# Patient Record
Sex: FEMALE | Race: WHITE | Employment: OTHER | URBAN - NONMETROPOLITAN AREA
[De-identification: names, ages, dates, MRNs, and addresses within clinical notes are randomized per-mention and may not be internally consistent; named-entity substitution may affect disease eponyms.]

---

## 2020-01-23 PROBLEM — R53.1 WEAKNESS: Status: ACTIVE | Noted: 2020-01-23

## 2020-01-24 ENCOUNTER — APPOINTMENT (OUTPATIENT)
Dept: CT IMAGING | Age: 85
DRG: 193 | End: 2020-01-24
Attending: FAMILY MEDICINE
Payer: MEDICARE

## 2020-01-24 ENCOUNTER — APPOINTMENT (OUTPATIENT)
Dept: GENERAL RADIOLOGY | Age: 85
DRG: 193 | End: 2020-01-24
Attending: FAMILY MEDICINE
Payer: MEDICARE

## 2020-01-24 ENCOUNTER — HOSPITAL ENCOUNTER (INPATIENT)
Age: 85
LOS: 4 days | Discharge: HOSPICE | DRG: 193 | End: 2020-01-28
Attending: FAMILY MEDICINE | Admitting: FAMILY MEDICINE
Payer: MEDICARE

## 2020-01-24 PROBLEM — E43 SEVERE MALNUTRITION (HCC): Chronic | Status: ACTIVE | Noted: 2020-01-24

## 2020-01-24 LAB
ALBUMIN SERPL-MCNC: 3 G/DL (ref 3.5–5.1)
ALP BLD-CCNC: 165 U/L (ref 38–126)
ALT SERPL-CCNC: 18 U/L (ref 11–66)
ANION GAP SERPL CALCULATED.3IONS-SCNC: 15 MEQ/L (ref 8–16)
ANION GAP SERPL CALCULATED.3IONS-SCNC: 15 MEQ/L (ref 8–16)
AST SERPL-CCNC: 28 U/L (ref 5–40)
BACTERIA: ABNORMAL /HPF
BASOPHILS # BLD: 0.2 %
BASOPHILS ABSOLUTE: 0 THOU/MM3 (ref 0–0.1)
BILIRUB SERPL-MCNC: 0.3 MG/DL (ref 0.3–1.2)
BILIRUBIN URINE: NEGATIVE
BLOOD, URINE: NEGATIVE
BUN BLDV-MCNC: 63 MG/DL (ref 7–22)
BUN BLDV-MCNC: 63 MG/DL (ref 7–22)
CALCIUM SERPL-MCNC: 8.6 MG/DL (ref 8.5–10.5)
CALCIUM SERPL-MCNC: 8.8 MG/DL (ref 8.5–10.5)
CASTS 2: ABNORMAL /LPF
CASTS UA: ABNORMAL /LPF
CHARACTER, URINE: ABNORMAL
CHLORIDE BLD-SCNC: 110 MEQ/L (ref 98–111)
CHLORIDE BLD-SCNC: 111 MEQ/L (ref 98–111)
CO2: 14 MEQ/L (ref 23–33)
CO2: 16 MEQ/L (ref 23–33)
COLOR: YELLOW
CREAT SERPL-MCNC: 1.6 MG/DL (ref 0.4–1.2)
CREAT SERPL-MCNC: 1.7 MG/DL (ref 0.4–1.2)
CREATININE URINE: 86.9 MG/DL
CRYSTALS, UA: ABNORMAL
EKG ATRIAL RATE: 58 BPM
EKG P AXIS: 76 DEGREES
EKG P-R INTERVAL: 168 MS
EKG Q-T INTERVAL: 456 MS
EKG QRS DURATION: 90 MS
EKG QTC CALCULATION (BAZETT): 447 MS
EKG R AXIS: 88 DEGREES
EKG T AXIS: -84 DEGREES
EKG VENTRICULAR RATE: 58 BPM
EOSINOPHIL # BLD: 0.3 %
EOSINOPHILS ABSOLUTE: 0 THOU/MM3 (ref 0–0.4)
EPITHELIAL CELLS, UA: ABNORMAL /HPF
ERYTHROCYTE [DISTWIDTH] IN BLOOD BY AUTOMATED COUNT: 15.7 % (ref 11.5–14.5)
ERYTHROCYTE [DISTWIDTH] IN BLOOD BY AUTOMATED COUNT: 50.8 FL (ref 35–45)
FERRITIN: 62 NG/ML (ref 10–291)
FLU A ANTIGEN: NEGATIVE
FLU B ANTIGEN: NEGATIVE
GFR SERPL CREATININE-BSD FRML MDRD: 28 ML/MIN/1.73M2
GFR SERPL CREATININE-BSD FRML MDRD: 30 ML/MIN/1.73M2
GLUCOSE BLD-MCNC: 72 MG/DL (ref 70–108)
GLUCOSE BLD-MCNC: 75 MG/DL (ref 70–108)
GLUCOSE URINE: NEGATIVE MG/DL
HCT VFR BLD CALC: 32.4 % (ref 37–47)
HEMOGLOBIN: 9.8 GM/DL (ref 12–16)
IMMATURE GRANS (ABS): 0.03 THOU/MM3 (ref 0–0.07)
IMMATURE GRANULOCYTES: 0.5 %
IRON: 30 UG/DL (ref 50–170)
KETONES, URINE: NEGATIVE
LEUKOCYTE ESTERASE, URINE: NEGATIVE
LYMPHOCYTES # BLD: 8.1 %
LYMPHOCYTES ABSOLUTE: 0.5 THOU/MM3 (ref 1–4.8)
MCH RBC QN AUTO: 27.1 PG (ref 26–33)
MCHC RBC AUTO-ENTMCNC: 30.2 GM/DL (ref 32.2–35.5)
MCV RBC AUTO: 89.8 FL (ref 81–99)
MISCELLANEOUS 2: ABNORMAL
MONOCYTES # BLD: 4.9 %
MONOCYTES ABSOLUTE: 0.3 THOU/MM3 (ref 0.4–1.3)
NITRITE, URINE: NEGATIVE
NUCLEATED RED BLOOD CELLS: 0 /100 WBC
PH UA: 5 (ref 5–9)
PLATELET # BLD: 159 THOU/MM3 (ref 130–400)
PMV BLD AUTO: 10.9 FL (ref 9.4–12.4)
POTASSIUM REFLEX MAGNESIUM: 4.4 MEQ/L (ref 3.5–5.2)
POTASSIUM SERPL-SCNC: 4.7 MEQ/L (ref 3.5–5.2)
PREALBUMIN: 8.5 MG/DL (ref 20–40)
PRO-BNP: ABNORMAL PG/ML (ref 0–1800)
PROCALCITONIN: 0.25 NG/ML (ref 0.01–0.09)
PROTEIN UA: 30
RBC # BLD: 3.61 MILL/MM3 (ref 4.2–5.4)
RBC URINE: ABNORMAL /HPF
RENAL EPITHELIAL, UA: ABNORMAL
SEG NEUTROPHILS: 86 %
SEGMENTED NEUTROPHILS ABSOLUTE COUNT: 5.5 THOU/MM3 (ref 1.8–7.7)
SODIUM BLD-SCNC: 140 MEQ/L (ref 135–145)
SODIUM BLD-SCNC: 141 MEQ/L (ref 135–145)
SODIUM URINE: < 20 MEQ/L
SPECIFIC GRAVITY, URINE: 1.01 (ref 1–1.03)
TOTAL CK: 107 U/L (ref 30–135)
TOTAL IRON BINDING CAPACITY: 228 UG/DL (ref 171–450)
TOTAL PROTEIN: 6.2 G/DL (ref 6.1–8)
UROBILINOGEN, URINE: 0.2 EU/DL (ref 0–1)
WBC # BLD: 6.4 THOU/MM3 (ref 4.8–10.8)
WBC UA: ABNORMAL /HPF
YEAST: ABNORMAL

## 2020-01-24 PROCEDURE — 2580000003 HC RX 258: Performed by: INTERNAL MEDICINE

## 2020-01-24 PROCEDURE — 99223 1ST HOSP IP/OBS HIGH 75: CPT | Performed by: PHYSICIAN ASSISTANT

## 2020-01-24 PROCEDURE — 2709999900 HC NON-CHARGEABLE SUPPLY

## 2020-01-24 PROCEDURE — 87449 NOS EACH ORGANISM AG IA: CPT

## 2020-01-24 PROCEDURE — 3209999900 XR COMPARISON OF OUTSIDE FILMS

## 2020-01-24 PROCEDURE — 94669 MECHANICAL CHEST WALL OSCILL: CPT

## 2020-01-24 PROCEDURE — 71045 X-RAY EXAM CHEST 1 VIEW: CPT

## 2020-01-24 PROCEDURE — 6370000000 HC RX 637 (ALT 250 FOR IP): Performed by: INTERNAL MEDICINE

## 2020-01-24 PROCEDURE — 84134 ASSAY OF PREALBUMIN: CPT

## 2020-01-24 PROCEDURE — 2580000003 HC RX 258: Performed by: PHYSICIAN ASSISTANT

## 2020-01-24 PROCEDURE — 94761 N-INVAS EAR/PLS OXIMETRY MLT: CPT

## 2020-01-24 PROCEDURE — 80053 COMPREHEN METABOLIC PANEL: CPT

## 2020-01-24 PROCEDURE — 83880 ASSAY OF NATRIURETIC PEPTIDE: CPT

## 2020-01-24 PROCEDURE — 71250 CT THORAX DX C-: CPT

## 2020-01-24 PROCEDURE — 82728 ASSAY OF FERRITIN: CPT

## 2020-01-24 PROCEDURE — 6370000000 HC RX 637 (ALT 250 FOR IP): Performed by: PHYSICIAN ASSISTANT

## 2020-01-24 PROCEDURE — 83550 IRON BINDING TEST: CPT

## 2020-01-24 PROCEDURE — P9047 ALBUMIN (HUMAN), 25%, 50ML: HCPCS | Performed by: INTERNAL MEDICINE

## 2020-01-24 PROCEDURE — 97530 THERAPEUTIC ACTIVITIES: CPT

## 2020-01-24 PROCEDURE — 82550 ASSAY OF CK (CPK): CPT

## 2020-01-24 PROCEDURE — 6360000002 HC RX W HCPCS: Performed by: INTERNAL MEDICINE

## 2020-01-24 PROCEDURE — 81001 URINALYSIS AUTO W/SCOPE: CPT

## 2020-01-24 PROCEDURE — 80048 BASIC METABOLIC PNL TOTAL CA: CPT

## 2020-01-24 PROCEDURE — 6360000002 HC RX W HCPCS: Performed by: PHYSICIAN ASSISTANT

## 2020-01-24 PROCEDURE — 82570 ASSAY OF URINE CREATININE: CPT

## 2020-01-24 PROCEDURE — 93005 ELECTROCARDIOGRAM TRACING: CPT | Performed by: PHYSICIAN ASSISTANT

## 2020-01-24 PROCEDURE — 87040 BLOOD CULTURE FOR BACTERIA: CPT

## 2020-01-24 PROCEDURE — 94640 AIRWAY INHALATION TREATMENT: CPT

## 2020-01-24 PROCEDURE — 83540 ASSAY OF IRON: CPT

## 2020-01-24 PROCEDURE — 87804 INFLUENZA ASSAY W/OPTIC: CPT

## 2020-01-24 PROCEDURE — 1200000003 HC TELEMETRY R&B

## 2020-01-24 PROCEDURE — 84300 ASSAY OF URINE SODIUM: CPT

## 2020-01-24 PROCEDURE — 36415 COLL VENOUS BLD VENIPUNCTURE: CPT

## 2020-01-24 PROCEDURE — 84145 PROCALCITONIN (PCT): CPT

## 2020-01-24 PROCEDURE — 87899 AGENT NOS ASSAY W/OPTIC: CPT

## 2020-01-24 PROCEDURE — 97166 OT EVAL MOD COMPLEX 45 MIN: CPT

## 2020-01-24 PROCEDURE — 85025 COMPLETE CBC W/AUTO DIFF WBC: CPT

## 2020-01-24 RX ORDER — FUROSEMIDE 10 MG/ML
20 INJECTION INTRAMUSCULAR; INTRAVENOUS ONCE
Status: DISCONTINUED | OUTPATIENT
Start: 2020-01-24 | End: 2020-01-24

## 2020-01-24 RX ORDER — PREDNISONE 20 MG/1
40 TABLET ORAL DAILY
Status: DISCONTINUED | OUTPATIENT
Start: 2020-01-24 | End: 2020-01-24

## 2020-01-24 RX ORDER — FUROSEMIDE 10 MG/ML
20 INJECTION INTRAMUSCULAR; INTRAVENOUS ONCE
Status: COMPLETED | OUTPATIENT
Start: 2020-01-24 | End: 2020-01-24

## 2020-01-24 RX ORDER — ALBUTEROL SULFATE 2.5 MG/3ML
2.5 SOLUTION RESPIRATORY (INHALATION) EVERY 6 HOURS PRN
Status: DISCONTINUED | OUTPATIENT
Start: 2020-01-24 | End: 2020-01-24

## 2020-01-24 RX ORDER — HYDRALAZINE HYDROCHLORIDE 25 MG/1
25 TABLET, FILM COATED ORAL EVERY 8 HOURS SCHEDULED
Status: DISCONTINUED | OUTPATIENT
Start: 2020-01-24 | End: 2020-01-28

## 2020-01-24 RX ORDER — DOXYCYCLINE HYCLATE 100 MG
100 TABLET ORAL EVERY 12 HOURS SCHEDULED
Status: DISCONTINUED | OUTPATIENT
Start: 2020-01-24 | End: 2020-01-24

## 2020-01-24 RX ORDER — AZITHROMYCIN 250 MG/1
500 TABLET, FILM COATED ORAL ONCE
Status: COMPLETED | OUTPATIENT
Start: 2020-01-24 | End: 2020-01-24

## 2020-01-24 RX ORDER — NICOTINE 21 MG/24HR
1 PATCH, TRANSDERMAL 24 HOURS TRANSDERMAL DAILY
Status: DISCONTINUED | OUTPATIENT
Start: 2020-01-24 | End: 2020-01-28 | Stop reason: HOSPADM

## 2020-01-24 RX ORDER — PRAVASTATIN SODIUM 40 MG
40 TABLET ORAL DAILY
Status: DISCONTINUED | OUTPATIENT
Start: 2020-01-24 | End: 2020-01-28 | Stop reason: HOSPADM

## 2020-01-24 RX ORDER — SODIUM CHLORIDE 0.9 % (FLUSH) 0.9 %
10 SYRINGE (ML) INJECTION EVERY 12 HOURS SCHEDULED
Status: DISCONTINUED | OUTPATIENT
Start: 2020-01-24 | End: 2020-01-28 | Stop reason: HOSPADM

## 2020-01-24 RX ORDER — SODIUM CHLORIDE 0.9 % (FLUSH) 0.9 %
10 SYRINGE (ML) INJECTION PRN
Status: DISCONTINUED | OUTPATIENT
Start: 2020-01-24 | End: 2020-01-28 | Stop reason: HOSPADM

## 2020-01-24 RX ORDER — SODIUM BICARBONATE 650 MG/1
650 TABLET ORAL 3 TIMES DAILY
Status: COMPLETED | OUTPATIENT
Start: 2020-01-24 | End: 2020-01-26

## 2020-01-24 RX ORDER — AMLODIPINE BESYLATE 10 MG/1
10 TABLET ORAL DAILY
Status: DISCONTINUED | OUTPATIENT
Start: 2020-01-24 | End: 2020-01-27

## 2020-01-24 RX ORDER — GUAIFENESIN, DEXTROMETHORPHAN HBR 600; 30 MG/1; MG/1
1 TABLET ORAL 2 TIMES DAILY
Status: DISCONTINUED | OUTPATIENT
Start: 2020-01-24 | End: 2020-01-28 | Stop reason: HOSPADM

## 2020-01-24 RX ORDER — LORAZEPAM 2 MG/ML
0.25 INJECTION INTRAMUSCULAR ONCE
Status: COMPLETED | OUTPATIENT
Start: 2020-01-24 | End: 2020-01-24

## 2020-01-24 RX ORDER — ACETAMINOPHEN 325 MG/1
650 TABLET ORAL EVERY 4 HOURS PRN
Status: DISCONTINUED | OUTPATIENT
Start: 2020-01-24 | End: 2020-01-28 | Stop reason: HOSPADM

## 2020-01-24 RX ORDER — SODIUM CHLORIDE 9 MG/ML
INJECTION, SOLUTION INTRAVENOUS CONTINUOUS
Status: DISCONTINUED | OUTPATIENT
Start: 2020-01-24 | End: 2020-01-25

## 2020-01-24 RX ORDER — PREDNISONE 20 MG/1
20 TABLET ORAL DAILY
Status: DISCONTINUED | OUTPATIENT
Start: 2020-01-25 | End: 2020-01-27

## 2020-01-24 RX ORDER — IPRATROPIUM BROMIDE AND ALBUTEROL SULFATE 2.5; .5 MG/3ML; MG/3ML
1 SOLUTION RESPIRATORY (INHALATION)
Status: DISCONTINUED | OUTPATIENT
Start: 2020-01-24 | End: 2020-01-25 | Stop reason: SDUPTHER

## 2020-01-24 RX ORDER — ONDANSETRON 2 MG/ML
4 INJECTION INTRAMUSCULAR; INTRAVENOUS EVERY 6 HOURS PRN
Status: DISCONTINUED | OUTPATIENT
Start: 2020-01-24 | End: 2020-01-28 | Stop reason: HOSPADM

## 2020-01-24 RX ORDER — ALBUMIN (HUMAN) 12.5 G/50ML
25 SOLUTION INTRAVENOUS ONCE
Status: COMPLETED | OUTPATIENT
Start: 2020-01-24 | End: 2020-01-24

## 2020-01-24 RX ORDER — AZITHROMYCIN 250 MG/1
250 TABLET, FILM COATED ORAL DAILY
Status: COMPLETED | OUTPATIENT
Start: 2020-01-25 | End: 2020-01-28

## 2020-01-24 RX ORDER — IPRATROPIUM BROMIDE AND ALBUTEROL SULFATE 2.5; .5 MG/3ML; MG/3ML
1 SOLUTION RESPIRATORY (INHALATION) EVERY 4 HOURS PRN
Status: DISCONTINUED | OUTPATIENT
Start: 2020-01-24 | End: 2020-01-25

## 2020-01-24 RX ORDER — HEPARIN SODIUM 5000 [USP'U]/ML
2000 INJECTION, SOLUTION INTRAVENOUS; SUBCUTANEOUS EVERY 12 HOURS
Status: DISCONTINUED | OUTPATIENT
Start: 2020-01-25 | End: 2020-01-27

## 2020-01-24 RX ORDER — LORAZEPAM 0.5 MG/1
0.25 TABLET ORAL DAILY PRN
Status: DISCONTINUED | OUTPATIENT
Start: 2020-01-24 | End: 2020-01-28 | Stop reason: HOSPADM

## 2020-01-24 RX ADMIN — SODIUM BICARBONATE 650 MG: 650 TABLET ORAL at 13:28

## 2020-01-24 RX ADMIN — SODIUM BICARBONATE 650 MG: 650 TABLET ORAL at 23:58

## 2020-01-24 RX ADMIN — ALBUMIN (HUMAN) 25 G: 0.25 INJECTION, SOLUTION INTRAVENOUS at 10:44

## 2020-01-24 RX ADMIN — IPRATROPIUM BROMIDE AND ALBUTEROL SULFATE 1 AMPULE: .5; 3 SOLUTION RESPIRATORY (INHALATION) at 12:17

## 2020-01-24 RX ADMIN — SODIUM CHLORIDE: 9 INJECTION, SOLUTION INTRAVENOUS at 09:50

## 2020-01-24 RX ADMIN — ALBUTEROL SULFATE 2.5 MG: 2.5 SOLUTION RESPIRATORY (INHALATION) at 05:44

## 2020-01-24 RX ADMIN — Medication 10 ML: at 09:50

## 2020-01-24 RX ADMIN — FUROSEMIDE 20 MG: 40 INJECTION, SOLUTION INTRAMUSCULAR; INTRAVENOUS at 11:48

## 2020-01-24 RX ADMIN — IPRATROPIUM BROMIDE AND ALBUTEROL SULFATE 1 AMPULE: .5; 3 SOLUTION RESPIRATORY (INHALATION) at 21:45

## 2020-01-24 RX ADMIN — PRAVASTATIN SODIUM 40 MG: 40 TABLET ORAL at 23:58

## 2020-01-24 RX ADMIN — GUAIFENESIN AND DEXTROMETHORPHAN HYDROBROMIDE 1 TABLET: 600; 30 TABLET, EXTENDED RELEASE ORAL at 23:58

## 2020-01-24 RX ADMIN — CEFTRIAXONE SODIUM 1 G: 1 INJECTION, POWDER, FOR SOLUTION INTRAMUSCULAR; INTRAVENOUS at 11:49

## 2020-01-24 RX ADMIN — ASPIRIN 325 MG: 325 TABLET, DELAYED RELEASE ORAL at 10:59

## 2020-01-24 RX ADMIN — LORAZEPAM 0.25 MG: 2 INJECTION INTRAMUSCULAR; INTRAVENOUS at 17:10

## 2020-01-24 RX ADMIN — Medication 10 ML: at 23:58

## 2020-01-24 RX ADMIN — IPRATROPIUM BROMIDE AND ALBUTEROL SULFATE 1 AMPULE: .5; 3 SOLUTION RESPIRATORY (INHALATION) at 15:43

## 2020-01-24 RX ADMIN — GUAIFENESIN AND DEXTROMETHORPHAN HYDROBROMIDE 1 TABLET: 600; 30 TABLET, EXTENDED RELEASE ORAL at 10:48

## 2020-01-24 RX ADMIN — AZITHROMYCIN 500 MG: 250 TABLET, FILM COATED ORAL at 10:50

## 2020-01-24 ASSESSMENT — ENCOUNTER SYMPTOMS
DIARRHEA: 0
COLOR CHANGE: 0
SINUS PAIN: 0
RHINORRHEA: 0
NAUSEA: 0
BACK PAIN: 0
ABDOMINAL PAIN: 0
EYE REDNESS: 0
SHORTNESS OF BREATH: 1
COUGH: 1
ABDOMINAL DISTENTION: 0
EYE PAIN: 0
CONSTIPATION: 0

## 2020-01-24 NOTE — H&P
[address] [place] [address] Hospitalist History & Physical   1/24/2020 12:54 AM   Assessment and Plan:        1. Generalized weakness:  Likely multifactorial with malnutrition, deconditioning, acute pneumonia, mild CHF. See below  2. Acute CA pneumonia: productive cough, inc in SOB. CXR reads right middle lobe pneumonia. Pend Urine Ag, procal, blood cultures. Start Doxycycline/Rocephin. 3. Mild Decompensated HFpEF 50-55%: likely 2/2 aortic stenosis/HTN. Acute decompensation 2/2 infection. P BMP 64302. Echo 1/23/20 (at [facility]) showed EF of 50-55%, Moderate diastolic dysfunction, severe b/l atrial enlargement, moderate mitral regurgitation, moderate aortic stenosis. Gentle diuresis. Daily weights. 4. Mild COPD exacerbation: likely 2/2 pna. Inc cough, SOB, sputum. On exam diffuse wheezing. 40 prednisone x5 days. Continue ABx. PRN duonebs. 5. JACKIE on CKD stage 2: (baseline Cr 0.7) Toya prerenal with BUN:Cr 37 2/2 CHF. Pend FENa. Gentle Diuresis. Daily IV lasix. Strict I&Os. Monitor with repeat BMP  6. Bilateral Pitting Edema Lower Extremity Edema: likey 2/2 CHF and ? norvasc involvement. Continue lasix, hold norvasc. 7. Essential Hypertension, controlled: Hold home norvasc for LE edema. Monitor. 8. Hyperlipidemia, controlled: Continue home statin. 9. Chronic normocytic anemia: likely 2/2 chronic disease.(hgb 9.8, MCV 89.8). Pend Fe studies. Monitor. Transfuse for hgb <7.   10. Isolated Alk Phos Elevation: follow up OP  11. Severe Malnutrition/Deconditioning: Albumin 3.0, Pre-albumin 8.5. Supplement shakes. Consult to dietician. Consult PT.   12. Code Status: Patient states she would like to be DNR but she is not oriented. We were not able to contact POA tonight. Will consult spiritual care/palliative care.      CC:  Generalized Weakness  HPI: Gray Wheeler is a 80year old white female current 0.5 PPD smoker with PMH of HTN, HLD, Centrilobaular emphysema, Thoracic AAA, Asx Carotid stenosis s/p left carotid endarterectomy, CKD, Macular degeneration, Osteoporosis who presents to [facility] from [facility] c/o generalized weakness since 12/25/19. Patient is oriented to self, president but not to the year or month. Patient when asked why she is here has given multiple varying answers including blood pressure, pneumonia, leg swelling. Patient endorses SOB, cough, inc sputum, LE edema x 3 weeks. Denies CP, abd pain, N/V/D. Need to verify medications/PMH with POA (daughter). At [facility], vitals showed: HR 66, RR 16, /56, 91% on RA (placed on 2 L 96%) and afebrile. Labs showed: BNP 57024, creat 1.74, GFR 26 BUN 62, ALk [id number], Hgb. 11.3. EKG showed NSR. CXR showed \"asymmetrical hilar densities, patchy rt bibasilar opacities possible pneumonia vs post obstructive atelectasis related to central lobar lesion\". Echo shows EF of 50-55 % Moderate diastolic dysfunction, normal rt ventrical severe bi- atrial enlargement, Mod aortic valve stenosis with mild regurg, Mod regurg in both mitral and tricuspid valves. Review of Systems   Constitutional: Negative for chills and fever. HENT: Negative for postnasal drip, rhinorrhea and sinus pain. Eyes: Negative for pain and redness. Respiratory: Positive for cough and shortness of breath. Cardiovascular: Positive for leg swelling (3 weeks). Negative for chest pain. Gastrointestinal: Negative for abdominal distention, abdominal pain, constipation, diarrhea and nausea. Endocrine: Negative for polydipsia and polyphagia. Genitourinary: Negative for frequency and urgency. Musculoskeletal: Negative for back pain and neck pain. Skin: Negative for color change and rash. Neurological: Positive for weakness. Negative for light-headedness and headaches. Psychiatric/Behavioral: Positive for confusion. Negative for agitation. PMH:  Per HPI  SHX:  Current 0.5 PPD smoker.  Denies drug or EtOH use  FHX: Unable to obtain  Allergies: Metonidazole, Benzoate  Medications: expansion. Diffuse wheezing. No rales or rhonchi. No retractions or use of abdominal muscles. Cardiac: S1, S2, RRR without murmur, rub, or gallop. No JVD  Abdomen: Abdomen flat, soft, nontender to palpation, without guarding or rigidity. Normoactive BS. Peripheral Vasculature: B/l extremities with 3+ pitting edema to knee. DP pulses found via US. Delayed capillary refill. Skin:  Skin moist, turgor with delayed recoil. No lesions, rash. Psych:  Alert and oriented to self and president but not to year or month. Confusions comes and goes. Affect appropriate  Lymph:  No supraclavicular or cervical adenopathy. Neurologic: No focal deficits. No Seizures.      Data:   Labs:   Results for orders placed or performed during the hospital encounter of 01/24/20   CBC auto differential   Result Value Ref Range    WBC 6.4 4.8 - 10.8 thou/mm3    RBC 3.61 (L) 4.20 - 5.40 mill/mm3    Hemoglobin 9.8 (L) 12.0 - 16.0 gm/dl    Hematocrit 32.4 (L) 37.0 - 47.0 %    MCV 89.8 81.0 - 99.0 fL    MCH 27.1 26.0 - 33.0 pg    MCHC 30.2 (L) 32.2 - 35.5 gm/dl    RDW-CV 15.7 (H) 11.5 - 14.5 %    RDW-SD 50.8 (H) 35.0 - 45.0 fL    Platelets 721 059 - 670 thou/mm3    MPV 10.9 9.4 - 12.4 fL    Seg Neutrophils 86.0 %    Lymphocytes 8.1 %    Monocytes 4.9 %    Eosinophils 0.3 %    Basophils 0.2 %    Immature Granulocytes 0.5 %    Segs Absolute 5.5 1.8 - 7.7 thou/mm3    Lymphocytes Absolute 0.5 (L) 1.0 - 4.8 thou/mm3    Monocytes Absolute 0.3 (L) 0.4 - 1.3 thou/mm3    Eosinophils Absolute 0.0 0.0 - 0.4 thou/mm3    Basophils Absolute 0.0 0.0 - 0.1 thou/mm3    Immature Grans (Abs) 0.03 0.00 - 0.07 thou/mm3    nRBC 0 /100 wbc   Comprehensive Metabolic Panel w/ Reflex to MG   Result Value Ref Range    Glucose 75 70 - 108 mg/dL    CREATININE 1.7 (H) 0.4 - 1.2 mg/dL    BUN 63 (H) 7 - 22 mg/dL    Sodium 141 135 - 145 meq/L    Potassium reflex Magnesium 4.4 3.5 - 5.2 meq/L    Chloride 110 98 - 111 meq/L    CO2 16 (L) 23 - 33 meq/L    Calcium 8.8 8.5 - 10.5 mg/dL    AST 28 5 - 40 U/L    Alkaline Phosphatase 165 (H) 38 - 126 U/L    Total Protein 6.2 6.1 - 8.0 g/dL    Alb 3.0 (L) 3.5 - 5.1 g/dL    Total Bilirubin 0.3 0.3 - 1.2 mg/dL    ALT 18 11 - 66 U/L   Prealbumin   Result Value Ref Range    Prealbumin 8.5 (L) 20.0 - 40.0 mg/dl   Brain Natriuretic Peptide   Result Value Ref Range    Pro-BNP 19480.0 (H) 0.0 - 1800.0 pg/mL   Anion Gap   Result Value Ref Range    Anion Gap 15.0 8.0 - 16.0 meq/L   Glomerular Filtration Rate, Estimated   Result Value Ref Range    Est, Glom Filt Rate 28 (A) ml/min/1.73m2   EKG 12 Lead   Result Value Ref Range    Ventricular Rate 58 BPM    Atrial Rate 58 BPM    P-R Interval 168 ms    QRS Duration 90 ms    Q-T Interval 456 ms    QTc Calculation (Bazett) 447 ms    P Axis 76 degrees    R Axis 88 degrees    T Axis -84 degrees       EKG / Radiology:     EKG:  Reviewed by me: sinus bradycardia (HR 58) with PVCs. New t-wave inversion in lateral/inferior leads. CXR:   Reviewed by me: Right middle lobe pneumonia. No results found.     Case and Plan discussed with Tere Salcido MD  Electronically signed by Lorrane Epley, [place] on 1/24/2020 at 8:47 AM

## 2020-01-24 NOTE — PROGRESS NOTES
Spoke to primary nurse regarding buttock and foot wound consult. Nurse states patient is 80 lbs, has superficial open area to coccyx, and a red spot on foot. Recommend sacral foam dressing, change every other day. Called distribution to have bilateral offloading boots. Have patient wear at all times while in bed and chair. Implement Gavino order sets for pressure injury prevention. Call wound ostomy as needed.

## 2020-01-24 NOTE — PROGRESS NOTES
poor po (consumes 2 meals/day pta - 1 meal is a yogurt, 2nd meal is frozen dinner, difficulty maintaining weight since food poisoning last July; Rx includes Lasix, Deltasone  · Wound Type: Stage III(and blister type area on foot)  · Current Nutrition Therapies:  · Oral Diet Orders: General, No Added Salt (3-4gm)   · Oral Diet intake: Unable to assess  · Oral Nutrition Supplement (ONS) Orders: Standard High Calorie Oral Supplement(TID)  · ONS intake: (initiated)  · Anthropometric Measures:  · Ht: 5' 2\" (157.5 cm)   · Current Body Wt: 75 lb (34 kg)(1/24, method not noted- ? stated; +3 edema)  · Admission Body Wt: 75 lb (34 kg)(1/24, method not noted- ? stated; +3 edema)  · Usual Body Wt: (per pt 102-105#; per EMR: 1/10/19: 96# 9.6 oz, 1/20/20: 82# 6.4 oz)  · % Weight Change:  ,  -14.5% in 1 year per EMR  · Ideal Body Wt: 110 lb (49.9 kg),  · BMI Classification: BMI <18.5 Underweight(13.7)    Nutrition Interventions:   Continue current diet, Start ONS  Continued Inpatient Monitoring, Education Initiated, Coordination of Care(1/24 Encouraged po intake at best efforts. Discussed small, frequent meals and appropriate use of ONS. Reviewed where to purchase ONS. Pt. voiced understanding.)    Nutrition Evaluation:   · Evaluation: Goals set   · Goals: Pt. will consume 75% or more of meals to promote wound healing during LOS.     · Monitoring: Meal Intake, Supplement Intake, Diet Tolerance, Wound Healing, Weight, Pertinent Labs, Patient/Family Education, Monitor Bowel Function      Electronically signed by Trini Gandhi RD, LD on 1/24/20 at 11:11 AM    Contact Number: [phone]

## 2020-01-24 NOTE — PROGRESS NOTES
Present to pt room for consult of right foot and coccyx wound. Pt noted to have healing scabbed area to right anterior foot where it appears that pt previously had blister that popped and is now healed. Area dry without drainage. Staff to continue to monitor. Pt does have edema to bilateral lower extremities - nurse states orders in for ACE wraps to BLE. Pt noted to have non blaching purple areas to bilateral knees present on admission. Pt did have fall at home and states she was down on knees. Staff to continue to monitor these areas. Pt rolled to left side for assessment. Pt has sacral foam in place. Peeled foam back. Pt noted to have unstagable pressure injury to coccyx present on admission. Cleansed wound with normal saline and gauze. Pat dry with clean gauze. Applied aquacel ag to wound bed followed by sacral bordered foam dressing. Staff to change dressing every other day. Staff to turn pt every 2 hours. Pt on hercules dream air support surface with alternating pressure and microclimate control. Assist pt to use restroom and stay dry and clean. No depends on. Offload bilateral heels with pillows. No redness or heel breakdown noted. Primary nurse at bedside preparing to straight cath patient. Coccyx unstageable pressure injury present on admission: 90% yellow, 10% red. Edges attached. Gretchen wound scarring and pink. Small yellow drainage. No odor. Right foot healing blistered area: 1.9cm x3cm. Wound bed dry and scabbed. No drainage. Edges attached. No odor. Thank you for allowing us to participate in the care of your patient. TIME   Wound/ostomy individual minutes  Time In: 0945  Time Out: 3161  Minutes: 10  Time does not include documentation.

## 2020-01-24 NOTE — PROGRESS NOTES
ADL's:  Additional Comments: declined ADls d/t fatigue. Functional Mobility:  Bed mobility  Supine to Sit: Minimal assistance(HOB raised increased time needed )  Scooting: Minimal assistance    Functional Mobility  Functional - Mobility Device: Rolling Walker  Activity: Other  Assist Level: Minimal assistance  Functional Mobility Comments: X 3 feet from EOB to recliner, vc needed for safety, pt impulsive      Balance:  Balance  Sitting Balance: Stand by assistance(at EOB X 5 minute sin prep for transfer, )  Standing Balance: Contact guard assistance(with BUE support on walker, reports feeling light headed stood X 3 minute sin prep for mobiliyt, reported feeling like she could not breath, seated rest break in recliner with pt reporting feeling better )    Transfers:  Sit to stand: Minimal assistance  Stand to sit: Minimal assistance  Transfer Comments: vc needed with tactile asisst to turn pt hips safely into recliner. pt attempting to sit prior to reaching destination. improvement noted wt vc and tactile assist       Upper Extremity Assessment:   LUE AROM : WFL  RUE AROM : WFL    LUE Strength  LUE Strength Comment: at least 3/5 grossly. pt very deconditioned this date    RUE Tone: Normotonic  LUE Tone: Normotonic       Activity Tolerance: Patient limited by fatigue  very SOB [facility] minimal activity    Assessment:  Assessment: Pt with stated deficits and very deconditioned this date requiring increased assitance with ADLs, transfers and mobility. Limited activiyt tolerance.  Pt will continue to benefit from skilled OT services to address deficits and increase safety and indep [facility] ADL routine  Performance deficits / Impairments: Decreased functional mobility , Decreased ADL status, Decreased safe awareness, Decreased balance, Decreased high-level IADLs, Decreased endurance, Decreased strength  Prognosis: Good  REQUIRES OT FOLLOW UP: Yes    Treatment Initiated: Treatment and education initiated within context of evaluation. Evaluation time included review of current medical information, gathering information related to past medical, social and functional history, completion of standardized testing, formal and informal observation of tasks, assessment of data and development of plan of care and goals. Treatment time included skilled education and facilitation of tasks to increase safety and independence with ADL's for improved functional independence and quality of life. Discharge Recommendations:  Continue to assess pending progress, Subacute/Skilled Nursing Facility, 24 hour supervision or assist, Patient would benefit from continued therapy after discharge    Patient Education:  OT Education: OT Role, Plan of Care, Precautions, ADL Adaptive Strategies, Transfer Training, Energy Conservation    Equipment Recommendations:   Other: con to assess, will needs shower chair, may benefit from [address]:  Times per week: 5x  Current Treatment Recommendations: Strengthening, Balance Training, Functional Mobility Training, Safety Education & Training, Self-Care / ADL, Endurance Training, Patient/Caregiver Education & Training    Goals:  Patient goals : to go home  Short term goals  Time Frame for Short term goals: by discharge  Short term goal 1: Pt will complete functional mobility to/from bathroom with SBA and RW with no vc for safety or breathing technique for ease with toileting routine  Short term goal 2: Pt will complete functional transfers with SBA and no vc for safety including to/from toilet  Short term goal 3: Pt will tolerate standing ADL/IADL with SBA X 3 minutes wtih 1-2 hand release and no lOB for ease with sink side grooming  Short term goal 4: Pt will complete BADL routine with min A and LHAE, EC strategies prn for ease with shower routine  Short term goal 5: Pt will tolerate BUE AROM exercises with no vc for breathing technique to increase UB strength and activity tolerance for ADLs  Long term

## 2020-01-24 NOTE — FLOWSHEET NOTE
01/24/20 1754   Provider Notification   Reason for Communication Evaluate   Provider Name [facility]   Provider Notification Physician   Method of Communication Call   Response No new orders   Notification Time [phone]     question of achalasia on CT scn of abdomen/pelvis

## 2020-01-24 NOTE — PROGRESS NOTES
Transfer  Report from [facility]  Referring Physician Dr Fabio Briseno  Accepting Physician Dr Sho Stearns  Patient Condition:87 yo female presents to [facility] ER with loss of appetite and SOB since [place]. Hx of HTN and high cholesterol as well as carotid artery stenosis. Was treated 3 weeks ago at urgent care for sinus infection with unknown antibiotic. Today has 3+ edema in bilateral lower legs from ankles to above the knees. PCP wanted her to go to ER a few days ago, she refused so he ordered labs. Results BNP 65729, creat 1.74, GFR 26 BUN 62, Na and K normal at 143 and 4.3. ALk [id number] which is normal for her, WBC 7.8 Hgb. 11.3, Plat 179 thyroid normal EKG sinus rhythm 66, CXR showed asymmetrical hilar densities, patchy rt bibasilar opacities possible pneumonia vs post obstructive atelectasis related to central lobar lesion. GFR to low to do CT scan. Vitals afebrile 66 16 131/56 91% on RA, placed on 2 L 96% . Echo shows EF of 50-55 % Moderate diastolic dysfunction, normal rt ventrical severe bi- atrial enlargement, Mod aortic valve stenosis with mild regurg, Mod regurg in both mitral and tricuspid valves.      Code Status full    Accepted on behalf of  Dr Sho Stearns to 3X02 with diagnosis of weakness

## 2020-01-24 NOTE — PROGRESS NOTES
Hospitalist Progress Note    Patient:  Roberto Hayes  YOB: 1933  MRN: [id number]   PCP: Mookie Marie MD         Acct: [de-identified]  Unit/Bed: [facility]30/030-    Date of Admission: 1/24/2020      ASSESSMENT   1. Community acquired bacterial pneumonia associated with mild COPD exacerbation. Procalcitonin increased. Continue breathing treatments, Mucinex DM, Acapella. Blood cultures ordered. Strep pneumonia antigen pending. 2. Acute on chronic heart failure with preserved EF in the setting of moderate aortic stenosis, severe pulmonary HTN, with bilateral LE edema: multifactorial bilateral LE edema- thought to be related to combination of low albumin, heart failure in the setting of moderate aortic stenosis. Possibly related to use of amlodipine so currently held. Will give Albumin and Lasix and reassess in am. Last echo on 1/23 showed EF of 50-55% with moderate aortic stenosis with JOSÉ of 1.3 cm2, moderate mitral regurigitation and modeate TR and VSP of 70 to 75 mmHg. Patient however dehydrated at the same yosvany as being fluid overloaded so will continue normal saline @ 40 mL/hr and reassess volume status later today. Ace wrap to the bilateral LE  3. Acute kidney injury with significant metabolic acidosis: secondary to pre-renal azotemia in the setting of heart failure and volume depletion in the setting of pneumonia. Sodium bicarbonate tabs. Avoid nephrotoxic medications. Au unremarkable for signs of infection  4. Normocytic anemia: Trend downwards in Hgb noted. Continue to monitor. No overt evidence of bleeding  5. Generalized weakness multifactorial related to #1, #2 and #3. Check magnesium, vitamin B12, folate, TSH  6. Essential HTN: Hold hydralazine, and amlodipine for now and reassess tomorrow  7. Isolated elevated alkaline phosphatase  8. Right foot ulcer: Does not appear to be infected. Wound care consult  9. Severe protein calorie malnutrition: Dietitian following.  Dietary supplements  10. Tobacco dependence: Smokes about 1/2 pack per day. Counseling provided and patient has not plans to quit and  Has no plans to quit  11. Hyperlipidemia: on pravastatin      PLAN     1. Breathing treatments, steroids at lower dose, Acapella. Mucinex DM. Treat with Ceftriaxone and azithromycin  2. Albumin, Lasix. Ace wraps to bilateral LED  3. Obtain CT chest to further investigate radiology note of \"post-obstructive atelactasis with possible centrilobular lesion'. 4. Trend troponin  5. Nutritional supplements  6. Sodium bicarbonate tabs - continue to monitor creatinine  7. Nicotine patch  8. Check Vitamin B12, folate, TSH  9. Reassess volume status later today  10. Management otherwise as noted above    ADDENDUM: Speech therapy evaluation added as nurse thought patient was choking on food. Ativan given for related anxiety. Gastroenterology consulted for question of achalasia. Pulmonology consulted for question of mucous plug with RML collapse    Anticipated Discharge in : 2-3 days    Code Status: Full Code    Electronically signed by Mana Joseph MD on 1/24/2020 at 4:50 PM      Chief Complaint     Transfer from [facility] for generalized weakness     SUBJECTIVE     The patient is a 80 y.o. female w/ a hx of tobacco dependence, essential HTN, HLD, moderate aortic stenosis and severe pulmonary HTN who was admitted for worsening shortness of breath and generalized weakness since December. The patient states that she initially had flu symptoms in December and has been persistently short of breath since then, with associated symptoms of a productive cough with worsening bilateral LE edema. She was initially evaluated at [facility] where EKG showed NSR, CXR showed \"asymmetrical hilar densities, patchy right bibasilar opacities possibly pneumonia vs post-obstructive atelactasis related to a central lobar lesion.        OBJECTIVE     Medications:  Reviewed    Infusion Medications    sodium chloride 40 Recent Labs     01/24/20  0407 01/24/20  0911    140   K 4.4 4.7    111   CO2 16* 14*   BUN 63* 63*   CREATININE 1.7* 1.6*   CALCIUM 8.8 8.6     Recent Labs     01/24/20  0407   AST 28   ALT 18   BILITOT 0.3   ALKPHOS 165*     No results for input(s): INR in the last 72 hours. Recent Labs     01/24/20  0911   CKTOTAL 107       Urinalysis:      Lab Results   Component Value Date    NITRU NEGATIVE 01/24/2020    WBCUA 0-2 01/24/2020    BACTERIA NONE SEEN 01/24/2020    RBCUA 0-2 01/24/2020    BLOODU NEGATIVE 01/24/2020    SPECGRAV 1.018 03/04/2015    GLUCOSEU NEGATIVE 01/24/2020       Diagnostic imaging/procedures       Xr Chest Portable    Result Date: 1/24/2020  PROCEDURE: XR CHEST PORTABLE CLINICAL INFORMATION: shortness of breath. COMPARISON: Chest x-ray dated 7/8/2016 TECHNIQUE: AP Portable chest xray FINDINGS: Lines/tubes/devices: None Lungs/pleura: There is right middle lobe pneumonia. Lungs are hyperinflated consistent with COPD. No pleural effusion. No pneumothorax. Heart: There is mild cardiomegaly, new compared to the prior study. Mediastinum/andrew: No obvious mass or adenopathy. Aorta is tortuous. Skeleton: No significant bone or joint abnormality. Other: There are again left neck surgical clips. Right middle lobe pneumonia. COPD. Mild cardiomegaly. **This report has been created using voice recognition software. It may contain minor errors which are inherent in voice recognition technology. ** Final report electronically signed by Dr. Prateek Lee on 1/24/2020 5:39 AM    Xr Comparison Of Outside Films    Result Date: 1/24/2020  Radiology exam is complete. No Radiologist dictation. Please follow up with ordering provider.        DVT prophylaxis: [x] Lovenox                                 [] SCDs                                 [] SQ Heparin                                 [] Encourage ambulation           [] Already on Anticoagulation     Disposition:    [x] Home       [] TCU       []

## 2020-01-25 ENCOUNTER — APPOINTMENT (OUTPATIENT)
Dept: ULTRASOUND IMAGING | Age: 85
DRG: 193 | End: 2020-01-25
Attending: FAMILY MEDICINE
Payer: MEDICARE

## 2020-01-25 LAB
ANION GAP SERPL CALCULATED.3IONS-SCNC: 17 MEQ/L (ref 8–16)
BUN BLDV-MCNC: 64 MG/DL (ref 7–22)
CALCIUM SERPL-MCNC: 8.5 MG/DL (ref 8.5–10.5)
CHLORIDE BLD-SCNC: 113 MEQ/L (ref 98–111)
CO2: 14 MEQ/L (ref 23–33)
CREAT SERPL-MCNC: 2.2 MG/DL (ref 0.4–1.2)
ERYTHROCYTE [DISTWIDTH] IN BLOOD BY AUTOMATED COUNT: 15.9 % (ref 11.5–14.5)
ERYTHROCYTE [DISTWIDTH] IN BLOOD BY AUTOMATED COUNT: 51.2 FL (ref 35–45)
FOLATE: 4 NG/ML (ref 4.8–24.2)
GFR SERPL CREATININE-BSD FRML MDRD: 21 ML/MIN/1.73M2
GLUCOSE BLD-MCNC: 74 MG/DL (ref 70–108)
HCT VFR BLD CALC: 28.6 % (ref 37–47)
HEMOGLOBIN: 8.6 GM/DL (ref 12–16)
LEGIONELLA PNEUMOPHILIA AG, URINE: NORMAL
MAGNESIUM: 2.1 MG/DL (ref 1.6–2.4)
MCH RBC QN AUTO: 26.8 PG (ref 26–33)
MCHC RBC AUTO-ENTMCNC: 30.1 GM/DL (ref 32.2–35.5)
MCV RBC AUTO: 89.1 FL (ref 81–99)
PLATELET # BLD: 139 THOU/MM3 (ref 130–400)
PMV BLD AUTO: 11.2 FL (ref 9.4–12.4)
POTASSIUM SERPL-SCNC: 4.7 MEQ/L (ref 3.5–5.2)
RBC # BLD: 3.21 MILL/MM3 (ref 4.2–5.4)
SODIUM BLD-SCNC: 144 MEQ/L (ref 135–145)
STREP PNEUMO AG, UR: NORMAL
TROPONIN T: 0.07 NG/ML
TSH SERPL DL<=0.05 MIU/L-ACNC: 3.88 UIU/ML (ref 0.4–4.2)
VITAMIN B-12: 690 PG/ML (ref 211–911)
WBC # BLD: 5.6 THOU/MM3 (ref 4.8–10.8)

## 2020-01-25 PROCEDURE — 85027 COMPLETE CBC AUTOMATED: CPT

## 2020-01-25 PROCEDURE — 99223 1ST HOSP IP/OBS HIGH 75: CPT | Performed by: INTERNAL MEDICINE

## 2020-01-25 PROCEDURE — 6370000000 HC RX 637 (ALT 250 FOR IP): Performed by: INTERNAL MEDICINE

## 2020-01-25 PROCEDURE — 84484 ASSAY OF TROPONIN QUANT: CPT

## 2020-01-25 PROCEDURE — 2580000003 HC RX 258: Performed by: INTERNAL MEDICINE

## 2020-01-25 PROCEDURE — 80048 BASIC METABOLIC PNL TOTAL CA: CPT

## 2020-01-25 PROCEDURE — 76770 US EXAM ABDO BACK WALL COMP: CPT

## 2020-01-25 PROCEDURE — 6360000002 HC RX W HCPCS: Performed by: INTERNAL MEDICINE

## 2020-01-25 PROCEDURE — 2500000003 HC RX 250 WO HCPCS: Performed by: INTERNAL MEDICINE

## 2020-01-25 PROCEDURE — 1200000003 HC TELEMETRY R&B

## 2020-01-25 PROCEDURE — 94760 N-INVAS EAR/PLS OXIMETRY 1: CPT

## 2020-01-25 PROCEDURE — 6370000000 HC RX 637 (ALT 250 FOR IP): Performed by: PHYSICIAN ASSISTANT

## 2020-01-25 PROCEDURE — 99222 1ST HOSP IP/OBS MODERATE 55: CPT | Performed by: INTERNAL MEDICINE

## 2020-01-25 PROCEDURE — 2709999900 HC NON-CHARGEABLE SUPPLY

## 2020-01-25 PROCEDURE — 2580000003 HC RX 258: Performed by: PHYSICIAN ASSISTANT

## 2020-01-25 PROCEDURE — 92610 EVALUATE SWALLOWING FUNCTION: CPT

## 2020-01-25 PROCEDURE — 83735 ASSAY OF MAGNESIUM: CPT

## 2020-01-25 PROCEDURE — 82607 VITAMIN B-12: CPT

## 2020-01-25 PROCEDURE — 84443 ASSAY THYROID STIM HORMONE: CPT

## 2020-01-25 PROCEDURE — 82746 ASSAY OF FOLIC ACID SERUM: CPT

## 2020-01-25 PROCEDURE — 36415 COLL VENOUS BLD VENIPUNCTURE: CPT

## 2020-01-25 PROCEDURE — 99233 SBSQ HOSP IP/OBS HIGH 50: CPT | Performed by: INTERNAL MEDICINE

## 2020-01-25 PROCEDURE — 6360000002 HC RX W HCPCS: Performed by: PHYSICIAN ASSISTANT

## 2020-01-25 PROCEDURE — 94640 AIRWAY INHALATION TREATMENT: CPT

## 2020-01-25 RX ORDER — FOLIC ACID 1 MG/1
1 TABLET ORAL DAILY
Status: DISCONTINUED | OUTPATIENT
Start: 2020-01-25 | End: 2020-01-28 | Stop reason: HOSPADM

## 2020-01-25 RX ORDER — IPRATROPIUM BROMIDE AND ALBUTEROL SULFATE 2.5; .5 MG/3ML; MG/3ML
1 SOLUTION RESPIRATORY (INHALATION) 4 TIMES DAILY
Status: DISCONTINUED | OUTPATIENT
Start: 2020-01-25 | End: 2020-01-28

## 2020-01-25 RX ADMIN — IPRATROPIUM BROMIDE AND ALBUTEROL SULFATE 1 AMPULE: .5; 3 SOLUTION RESPIRATORY (INHALATION) at 12:40

## 2020-01-25 RX ADMIN — GUAIFENESIN AND DEXTROMETHORPHAN HYDROBROMIDE 1 TABLET: 600; 30 TABLET, EXTENDED RELEASE ORAL at 08:45

## 2020-01-25 RX ADMIN — SODIUM CHLORIDE: 9 INJECTION, SOLUTION INTRAVENOUS at 16:27

## 2020-01-25 RX ADMIN — FOLIC ACID 1 MG: 1 TABLET ORAL at 13:32

## 2020-01-25 RX ADMIN — AZITHROMYCIN 250 MG: 250 TABLET, FILM COATED ORAL at 08:45

## 2020-01-25 RX ADMIN — IPRATROPIUM BROMIDE AND ALBUTEROL SULFATE 1 AMPULE: .5; 3 SOLUTION RESPIRATORY (INHALATION) at 20:32

## 2020-01-25 RX ADMIN — SODIUM BICARBONATE: 84 INJECTION, SOLUTION INTRAVENOUS at 23:37

## 2020-01-25 RX ADMIN — SODIUM BICARBONATE 650 MG: 650 TABLET ORAL at 08:45

## 2020-01-25 RX ADMIN — PREDNISONE 20 MG: 20 TABLET ORAL at 08:45

## 2020-01-25 RX ADMIN — SODIUM BICARBONATE 650 MG: 650 TABLET ORAL at 16:31

## 2020-01-25 RX ADMIN — HYDRALAZINE HYDROCHLORIDE 25 MG: 25 TABLET, FILM COATED ORAL at 23:26

## 2020-01-25 RX ADMIN — SODIUM BICARBONATE 650 MG: 650 TABLET ORAL at 23:26

## 2020-01-25 RX ADMIN — Medication 10 ML: at 09:04

## 2020-01-25 RX ADMIN — HYDRALAZINE HYDROCHLORIDE 25 MG: 25 TABLET, FILM COATED ORAL at 13:33

## 2020-01-25 RX ADMIN — HEPARIN SODIUM 2000 UNITS: 5000 INJECTION INTRAVENOUS; SUBCUTANEOUS at 23:26

## 2020-01-25 RX ADMIN — IPRATROPIUM BROMIDE AND ALBUTEROL SULFATE 1 AMPULE: .5; 3 SOLUTION RESPIRATORY (INHALATION) at 16:24

## 2020-01-25 RX ADMIN — PRAVASTATIN SODIUM 40 MG: 40 TABLET ORAL at 23:26

## 2020-01-25 RX ADMIN — ASPIRIN 325 MG: 325 TABLET, DELAYED RELEASE ORAL at 08:45

## 2020-01-25 RX ADMIN — CEFTRIAXONE SODIUM 1 G: 1 INJECTION, POWDER, FOR SOLUTION INTRAMUSCULAR; INTRAVENOUS at 09:03

## 2020-01-25 RX ADMIN — GUAIFENESIN AND DEXTROMETHORPHAN HYDROBROMIDE 1 TABLET: 600; 30 TABLET, EXTENDED RELEASE ORAL at 23:26

## 2020-01-25 RX ADMIN — IPRATROPIUM BROMIDE AND ALBUTEROL SULFATE 1 AMPULE: .5; 3 SOLUTION RESPIRATORY (INHALATION) at 07:59

## 2020-01-25 RX ADMIN — HEPARIN SODIUM 2000 UNITS: 5000 INJECTION INTRAVENOUS; SUBCUTANEOUS at 08:42

## 2020-01-25 ASSESSMENT — PAIN SCALES - GENERAL
PAINLEVEL_OUTOF10: 0
PAINLEVEL_OUTOF10: 0

## 2020-01-25 NOTE — CONSULTS
Supplement  Allergies    Flagyl [metronidazole]  Social History     Social History     Socioeconomic History    Marital status:      Spouse name: Not on file    Number of children: Not on file    Years of education: Not on file    Highest education level: Not on file   Occupational History    Not on file   Social Needs    Financial resource strain: Not on file    Food insecurity:     Worry: Not on file     Inability: Not on file    Transportation needs:     Medical: Not on file     Non-medical: Not on file   Tobacco Use    Smoking status: Current Every Day Smoker     Packs/day: 0.50    Smokeless tobacco: Never Used   Substance and Sexual Activity    Alcohol use: Yes     Comment: rarely    Drug use: No    Sexual activity: Not on file   Lifestyle    Physical activity:     Days per week: Not on file     Minutes per session: Not on file    Stress: Not on file   Relationships    Social connections:     Talks on phone: Not on file     Gets together: Not on file     Attends [employer] service: Not on file     Active member of club or organization: Not on file     Attends meetings of clubs or organizations: Not on file     Relationship status: Not on file    Intimate partner violence:     Fear of current or ex partner: Not on file     Emotionally abused: Not on file     Physically abused: Not on file     Forced sexual activity: Not on file   Other Topics Concern    Not on file   Social History Narrative    Not on file     Family History    No family history on file. ROS    General/Constitutional: Positive, wt loss, weakness   HENT: Negative. Eyes: Negative. Upper respiratory tract: No nasal stuffiness or post nasal drip. Lower respiratory tract/ lungs: positive productive cough, tan colored sputum production, wheezing, no hemoptysis. Cardiovascular: No palpitations, chest pain, positive bilateral LE edema  Gastrointestinal: No nausea or vomiting.   Neurological: confusion, forgetfulness Extremities: No tenderness. Musculoskeletal: no complaints  Genitourinary: No complaints. Hematological: Negative. Denies easy buising  Skin: No itching. Meds    Current Medications    folic acid  1 mg Oral Daily    sodium chloride flush  10 mL Intravenous 2 times per day    [Held by provider] amLODIPine  10 mg Oral Daily    aspirin  325 mg Oral Daily    hydrALAZINE  25 mg Oral 3 times per day    pravastatin  40 mg Oral Daily    cefTRIAXone (ROCEPHIN) IV  1 g Intravenous Q24H    azithromycin  250 mg Oral Daily    dextromethorphan-guaiFENesin  1 tablet Oral BID    ipratropium-albuterol  1 ampule Inhalation Q4H WA    sodium bicarbonate  650 mg Oral TID    nicotine  1 patch Transdermal Daily    predniSONE  20 mg Oral Daily    heparin (porcine)  2,000 Units Subcutaneous Q12H     sodium chloride flush, magnesium hydroxide, ondansetron, acetaminophen, ipratropium-albuterol, LORazepam  IV Drips/Infusions   sodium chloride 40 mL/hr at 01/24/20 0950     Vitals    Vitals    height is 5' 2\" (1.575 m) and weight is 75 lb (34 kg). Her oral temperature is 98.4 °F (36.9 °C). Her blood pressure is 133/54 (abnormal) and her pulse is 78. Her respiration is 16 and oxygen saturation is 96%. I/O    Intake/Output Summary (Last 24 hours) at 1/25/2020 0942  Last data filed at 1/24/2020 1323  Gross per 24 hour   Intake 202 ml   Output 400 ml   Net -198 ml     Patient Vitals for the past 96 hrs (Last 3 readings):   Weight   01/24/20 0900 75 lb (34 kg)     Exam   Constitutional: Elderly lady, severely malnourished,BMI 15, on supplemental oxygen, able to answer simple questions. Head: Normocephalic and atraumatic. Mouth/Throat: Oropharynx is clear and moist.  No oral thrush. Eyes: Conjunctivae are normal. Pupils are equal, round, and reactive to light. No scleral icterus. Neck: JVD  Cardiovascular: Regular rate, regular rhythm, S1 and S2 with no murmur.   No peripheral edema  Pulmonary/Chest: Coarse rhonchi

## 2020-01-25 NOTE — CONSULTS
Patient seen and examined, note dictated. A/P  1. Patient admitted with malnutrition, and pneumonia. A CT scan showed dilated esophagus with possible achalasia. Will change to liquids PO;  Check esophogram 1/27/2020. (Cannot due EGD at this time due to pneumonia). Will follow, tx!

## 2020-01-25 NOTE — PROGRESS NOTES
Impaired:  Delayed Swallow, Decreased Hyolaryngeal Elevation, Audible Swallow and Suspected Pharyngeal Residue    Signs and Symptoms of Laryngeal Penetration/Aspiration: Throat Clear, Immediate Cough, Delayed Cough and Wet Vocal Quality    Impresssions: Pt presents with mild-moderate oropharyngeal dysphagia characterized by the findings outlined above. Pt reports she prefers soft foods at home due to hard/tough solids \"getting stuck in throat. \" Reduced oral manipulation of puree, soft solid and hard solid textures, resulting in prolonged oral phase. Pt with overt expression of difficulty masticating hard solid cracker, stating, \"I don't like this it is too hard. \" Following prolonged breakdown and bolus formation period, slow but functional AP transit noted. Slightly delayed trigger of swallow reflex with decreased hyolaryngeal anterior excursion noted via laryngeal palpation. Pt requiring x3-4 swallows per single bite of puree, soft and hard solids, stating soft and hard solids \"stay stuck in my throat\" while palpating her larynx. Immediate coughing in 1/1 trials of hard solid cracker with pt refusing further hard solid trials. Cracker moistened with pudding resulting in slightly improved effectiveness of swallow, though immediate throat clears with delayed cough to follow. Thin liquid via cup completed with good success, though thin liquid utilized as liquid wash to follow hard solid cracker resulting in delayed coughing, suspect due to potential pharyngeal residue. No further s/s of aspiration with thin liquids via cup and straw, including use as liquid wash to follow puree. Pt reports dislike of straws; successive straw drinks with evident increase in fatigue, likely due to pulmonary compromise at this time. Recommend diet modification to puree solids with thin liquids via cup only (I.e., avoidance of straws); monitor pulmonary status for compromise. Recommend medications taken with puree, single pill at once.

## 2020-01-25 NOTE — CONSULTS
[facility]    [address], [address]  [facility]  1/25/2020 5:21 PM    Pt Name:    Arely Christine  MRN:     [id number]   [id number]  YOB: 1933  Admit Date:    1/24/2020 12:54 AM  Primary Care Physician:  Leonid Allen MD    CSN Number:   [id number]    Reason for Consult:  JACKIE on CKD  Requesting provider:  Dr. Dereck Gross    History:   The patient is a 80 y.o. elderly white female with history of hypertension, chronic kidney disease stage III who has never seen a nephrologist who was brought for evaluation of progressively worsening lower extremity swelling associated with poor oral intake as well as weight loss associated with generalized weakness. Patient reports severe weakness with minimal exertion without any elevating or any aggravating factors. Patient reports that she has not been eating well. She has significant history of half pack cigarette smoking for several years. She also has history of carotid artery stenosis and has had left carotid endarterectomy in the past.  Patient reports shortness of breath with minimal exertion associated with some cough and lower extremity edema. Denies any chest pain. Denies any nausea vomiting or diarrhea. She denies any history of any cancer she denies any history of previous strokes she denies any dysuria or any gross hematuria. Patient is a poor historian and not able to recall all the details. Daughter is in the room who says that patient saw a liver doctor in the past but did not follow-up. No history of any alcohol use. No history of any hepatitis infection that patient is aware of. Nephrology has been asked to see her in consultation due to rising serum creatinine. Her serum creatinine was 1.7 on admission but today has gone up to 2.2.   Per old records her serum creatinine was 1.2 in October 2018 and it was 0.9 in July 2018-essentially CKD stage IIIb per EGFR based on those numbness or tingling, no slurred speech  Psychiatric: stable mood, no depression or insomnia    All other review of systems were reviewed and negative    Current Meds:  Infusion:    sodium chloride 40 mL/hr at 01/25/20 1627     Meds:    folic acid  1 mg Oral Daily    sodium chloride flush  10 mL Intravenous 2 times per day    [Held by provider] amLODIPine  10 mg Oral Daily    aspirin  325 mg Oral Daily    hydrALAZINE  25 mg Oral 3 times per day    pravastatin  40 mg Oral Daily    cefTRIAXone (ROCEPHIN) IV  1 g Intravenous Q24H    azithromycin  250 mg Oral Daily    dextromethorphan-guaiFENesin  1 tablet Oral BID    ipratropium-albuterol  1 ampule Inhalation Q4H WA    sodium bicarbonate  650 mg Oral TID    nicotine  1 patch Transdermal Daily    predniSONE  20 mg Oral Daily    heparin (porcine)  2,000 Units Subcutaneous Q12H     Meds prn: sodium chloride flush, magnesium hydroxide, ondansetron, acetaminophen, ipratropium-albuterol, LORazepam     Allergies/Intolerances: ALLERGIES: Flagyl [metronidazole]    24HR INTAKE/OUTPUT:      Intake/Output Summary (Last 24 hours) at 1/25/2020 1721  Last data filed at 1/25/2020 1440  Gross per 24 hour   Intake 432 ml   Output --   Net 432 ml     I/O last 3 completed shifts: In: 432 [I.V.:432]  Out: -   No intake/output data recorded. Admission weight: 75 lb (34 kg)  Wt Readings from Last 3 Encounters:   01/24/20 75 lb (34 kg)   07/15/16 105 lb (47.6 kg)   07/09/16 105 lb 11.2 oz (47.9 kg)     Body mass index is 13.72 kg/m².     Physical Examination:  VITALS:  /69   Pulse 75   Temp 97.6 °F (36.4 °C) (Oral)   Resp 16   Ht 5' 2\" (1.575 m)   Wt 75 lb (34 kg)   SpO2 95%   BMI 13.72 kg/m²   Weight:   Wt Readings from Last 3 Encounters:   01/24/20 75 lb (34 kg)   07/15/16 105 lb (47.6 kg)   07/09/16 105 lb 11.2 oz (47.9 kg)     Constitutional and General Appearance: alert and cooperative with exam, appears comfortable, no distress, appears severely underweight/undernourished, on room air  Eyes: no icteric sclera, has pallor conjunctiva, no discharge seen from either eye  Ears and Nose: normal external appearance of left and right ear and nose. Both ear lobules nontender to palpation. No active drainage from nose. Oral: moist oral mucus membranes  Neck: No jugular venous distention, appears symmetric, good ROM  Lungs: Air entry B/L, no crackles or rales, no use of accessory muscles, poor inspiratory effort  Heart: regular rate, S1, S2  Extremities: Trace lower extremity edema  GI: soft, non-tender, no guarding  Skin: no rash seen on exposed extremities  Musculo: moves all extremities  Neuro: no slurred speech, no facial drooping, symmetric strength  Psychiatric: Normal affect and mood    Lab Data  CBC:   Recent Labs     01/24/20 0407 01/25/20  0652   WBC 6.4 5.6   HGB 9.8* 8.6*   HCT 32.4* 28.6*    139     BMP:  Recent Labs     01/24/20 0407 01/24/20  0911 01/25/20  0652    140 144   K 4.4 4.7 4.7    111 113*   CO2 16* 14* 14*   BUN 63* 63* 64*   CREATININE 1.7* 1.6* 2.2*   GLUCOSE 75 72 74   CALCIUM 8.8 8.6 8.5   MG  --   --  2.1     PTH: @PTH@  TSH:   Recent Labs     01/25/20  0652   TSH 3.880     HgBa1c: No results for input(s): LABA1C in the last 72 hours. Hepatic:   Recent Labs     01/24/20 0407   LABALBU 3.0*   AST 28   ALT 18   BILITOT 0.3   ALKPHOS 165*     ABGs: No results found for: PHART, PO2ART, CRQ4OHR  Troponin: No results for input(s): TROPONINI in the last 72 hours. BNP: No results for input(s): BNP in the last 72 hours. Additional Labs: proBNP 30,260.  UA shows no blood, trace protein. Urine sodium is less than 20  Diagnostics: Chest x-ray shows right middle lobe pneumonia, COPD and cardiomegaly    Old labs and diagnostics reviewed. No recent echo available. Serum creatinine 1.2 in October 2018 under care everywhere      Impression and Plan:  1. JACKIE on CKD 3. Clinically patient somewhat volume depleted.   Her

## 2020-01-25 NOTE — PROGRESS NOTES
supplements  8. Essential HTN: Restart hydralazine. Continue to hold amlodipine. Reassess in am  9. Isolated elevated alkaline phosphatase  10. Right foot ulcer: Does not appear to be infected. Wound care consult  11. Severe protein calorie malnutrition: Dietitian following. Dietary supplements  12. Tobacco dependence: Smokes about 1/2 pack per day. Counseling provided and patient has not plans to quit and  Has no plans to quit  13. Hyperlipidemia: on pravastatin      PLAN     1. Continue to monitor creatinine  2. Pulmonology consul for RML collapse  3. Gasroenerology consult for possible achalasia  4. FOBT    Anticipated Discharge in : 2-3 days    Code Status: Full Code    Electronically signed by Yolie Wooten MD on 1/25/2020 at 1:58 PM      Chief Complaint     Transfer from [facility] for generalized weakness     SUBJECTIVE     The patient is a 80 y.o. female w/ a hx of tobacco dependence, essential HTN, HLD, moderate aortic stenosis and severe pulmonary HTN who was admitted for worsening shortness of breath and generalized weakness since December. The patient states that she initially had flu symptoms in December and has been persistently short of breath since then, with associated symptoms of a productive cough with worsening bilateral LE edema. She was initially evaluated at [facility] where EKG showed NSR, CXR showed \"asymmetrical hilar densities, patchy right bibasilar opacities possibly pneumonia vs post-obstructive atelactasis related to a central lobar lesion.     - Spoke to patient's daughter yesterday and she states that patient garcia shave some problems when eating- seems like she burps a lot and has difficulty with swallowing. Patient feels much better today in terms of her SOB.  Denies CP      OBJECTIVE     Medications:  Reviewed    Infusion Medications    sodium chloride 40 mL/hr at 01/24/20 0950     Scheduled Medications    folic acid  1 mg Oral Daily    sodium chloride flush  10 mL Intravenous results for input(s): INR in the last 72 hours. Recent Labs     01/24/20  0911   CKTOTAL 107       Urinalysis:      Lab Results   Component Value Date    NITRU NEGATIVE 01/24/2020    WBCUA 0-2 01/24/2020    BACTERIA NONE SEEN 01/24/2020    RBCUA 0-2 01/24/2020    BLOODU NEGATIVE 01/24/2020    SPECGRAV 1.018 03/04/2015    GLUCOSEU NEGATIVE 01/24/2020       Diagnostic imaging/procedures       Xr Chest Portable    Result Date: 1/24/2020  PROCEDURE: XR CHEST PORTABLE CLINICAL INFORMATION: shortness of breath. COMPARISON: Chest x-ray dated 7/8/2016 TECHNIQUE: AP Portable chest xray FINDINGS: Lines/tubes/devices: None Lungs/pleura: There is right middle lobe pneumonia. Lungs are hyperinflated consistent with COPD. No pleural effusion. No pneumothorax. Heart: There is mild cardiomegaly, new compared to the prior study. Mediastinum/andrew: No obvious mass or adenopathy. Aorta is tortuous. Skeleton: No significant bone or joint abnormality. Other: There are again left neck surgical clips. Right middle lobe pneumonia. COPD. Mild cardiomegaly. **This report has been created using voice recognition software. It may contain minor errors which are inherent in voice recognition technology. ** Final report electronically signed by Dr. Aakash Davalos on 1/24/2020 5:39 AM    Xr Comparison Of Outside Films    Result Date: 1/24/2020  Radiology exam is complete. No Radiologist dictation. Please follow up with ordering provider.        DVT prophylaxis: [x] Lovenox                                 [] SCDs                                 [] SQ Heparin                                 [] Encourage ambulation           [] Already on Anticoagulation     Disposition:    [x] Home       [] TCU       [] Rehab       [] Psych       [] SNF       [] [place]       [] Other-

## 2020-01-26 ENCOUNTER — APPOINTMENT (OUTPATIENT)
Dept: ULTRASOUND IMAGING | Age: 85
DRG: 193 | End: 2020-01-26
Attending: FAMILY MEDICINE
Payer: MEDICARE

## 2020-01-26 LAB
ALLEN TEST: POSITIVE
ANION GAP SERPL CALCULATED.3IONS-SCNC: 16 MEQ/L (ref 8–16)
BASE EXCESS (CALCULATED): -4.6 MMOL/L (ref -2.5–2.5)
BUN BLDV-MCNC: 72 MG/DL (ref 7–22)
CALCIUM SERPL-MCNC: 8.4 MG/DL (ref 8.5–10.5)
CHLORIDE BLD-SCNC: 111 MEQ/L (ref 98–111)
CO2: 16 MEQ/L (ref 23–33)
COLLECTED BY:: ABNORMAL
CREAT SERPL-MCNC: 2.1 MG/DL (ref 0.4–1.2)
DEVICE: ABNORMAL
GFR SERPL CREATININE-BSD FRML MDRD: 22 ML/MIN/1.73M2
GLUCOSE BLD-MCNC: 98 MG/DL (ref 70–108)
HCO3: 20 MMOL/L (ref 23–28)
HEMOCCULT STL QL: POSITIVE
MAGNESIUM: 2.2 MG/DL (ref 1.6–2.4)
O2 SATURATION: 94 %
PCO2: 33 MMHG (ref 35–45)
PH BLOOD GAS: 7.39 (ref 7.35–7.45)
PH VENOUS: 7.32 (ref 7.31–7.41)
PO2: 70 MMHG (ref 71–104)
POTASSIUM SERPL-SCNC: 4.7 MEQ/L (ref 3.5–5.2)
SODIUM BLD-SCNC: 143 MEQ/L (ref 135–145)
SOURCE, BLOOD GAS: ABNORMAL
TROPONIN T: 0.1 NG/ML

## 2020-01-26 PROCEDURE — 2709999900 HC NON-CHARGEABLE SUPPLY

## 2020-01-26 PROCEDURE — 86480 TB TEST CELL IMMUN MEASURE: CPT

## 2020-01-26 PROCEDURE — 83735 ASSAY OF MAGNESIUM: CPT

## 2020-01-26 PROCEDURE — 94640 AIRWAY INHALATION TREATMENT: CPT

## 2020-01-26 PROCEDURE — 2580000003 HC RX 258: Performed by: INTERNAL MEDICINE

## 2020-01-26 PROCEDURE — 6370000000 HC RX 637 (ALT 250 FOR IP): Performed by: PHYSICIAN ASSISTANT

## 2020-01-26 PROCEDURE — 1200000003 HC TELEMETRY R&B

## 2020-01-26 PROCEDURE — 99233 SBSQ HOSP IP/OBS HIGH 50: CPT | Performed by: INTERNAL MEDICINE

## 2020-01-26 PROCEDURE — 82800 BLOOD PH: CPT

## 2020-01-26 PROCEDURE — 82803 BLOOD GASES ANY COMBINATION: CPT

## 2020-01-26 PROCEDURE — 36600 WITHDRAWAL OF ARTERIAL BLOOD: CPT

## 2020-01-26 PROCEDURE — 99222 1ST HOSP IP/OBS MODERATE 55: CPT | Performed by: INTERNAL MEDICINE

## 2020-01-26 PROCEDURE — 6370000000 HC RX 637 (ALT 250 FOR IP): Performed by: INTERNAL MEDICINE

## 2020-01-26 PROCEDURE — 93005 ELECTROCARDIOGRAM TRACING: CPT | Performed by: INTERNAL MEDICINE

## 2020-01-26 PROCEDURE — 6360000002 HC RX W HCPCS: Performed by: PHYSICIAN ASSISTANT

## 2020-01-26 PROCEDURE — 80048 BASIC METABOLIC PNL TOTAL CA: CPT

## 2020-01-26 PROCEDURE — 76705 ECHO EXAM OF ABDOMEN: CPT

## 2020-01-26 PROCEDURE — 99232 SBSQ HOSP IP/OBS MODERATE 35: CPT | Performed by: INTERNAL MEDICINE

## 2020-01-26 PROCEDURE — 6360000002 HC RX W HCPCS: Performed by: INTERNAL MEDICINE

## 2020-01-26 PROCEDURE — 82272 OCCULT BLD FECES 1-3 TESTS: CPT

## 2020-01-26 PROCEDURE — 94760 N-INVAS EAR/PLS OXIMETRY 1: CPT

## 2020-01-26 PROCEDURE — 84484 ASSAY OF TROPONIN QUANT: CPT

## 2020-01-26 PROCEDURE — 51798 US URINE CAPACITY MEASURE: CPT

## 2020-01-26 PROCEDURE — 36415 COLL VENOUS BLD VENIPUNCTURE: CPT

## 2020-01-26 PROCEDURE — 2580000003 HC RX 258: Performed by: PHYSICIAN ASSISTANT

## 2020-01-26 PROCEDURE — 94669 MECHANICAL CHEST WALL OSCILL: CPT

## 2020-01-26 RX ORDER — PANTOPRAZOLE SODIUM 40 MG/1
40 TABLET, DELAYED RELEASE ORAL
Status: DISCONTINUED | OUTPATIENT
Start: 2020-01-26 | End: 2020-01-27

## 2020-01-26 RX ADMIN — HEPARIN SODIUM 2000 UNITS: 5000 INJECTION INTRAVENOUS; SUBCUTANEOUS at 07:55

## 2020-01-26 RX ADMIN — GUAIFENESIN AND DEXTROMETHORPHAN HYDROBROMIDE 1 TABLET: 600; 30 TABLET, EXTENDED RELEASE ORAL at 22:57

## 2020-01-26 RX ADMIN — IPRATROPIUM BROMIDE AND ALBUTEROL SULFATE 1 AMPULE: .5; 3 SOLUTION RESPIRATORY (INHALATION) at 15:21

## 2020-01-26 RX ADMIN — GUAIFENESIN AND DEXTROMETHORPHAN HYDROBROMIDE 1 TABLET: 600; 30 TABLET, EXTENDED RELEASE ORAL at 08:02

## 2020-01-26 RX ADMIN — SODIUM BICARBONATE 650 MG: 650 TABLET ORAL at 07:55

## 2020-01-26 RX ADMIN — HYDRALAZINE HYDROCHLORIDE 25 MG: 25 TABLET, FILM COATED ORAL at 22:57

## 2020-01-26 RX ADMIN — CEFTRIAXONE SODIUM 1 G: 1 INJECTION, POWDER, FOR SOLUTION INTRAMUSCULAR; INTRAVENOUS at 07:55

## 2020-01-26 RX ADMIN — IRON SUCROSE 200 MG: 20 INJECTION, SOLUTION INTRAVENOUS at 10:40

## 2020-01-26 RX ADMIN — PREDNISONE 20 MG: 20 TABLET ORAL at 07:55

## 2020-01-26 RX ADMIN — FOLIC ACID 1 MG: 1 TABLET ORAL at 07:55

## 2020-01-26 RX ADMIN — AZITHROMYCIN 250 MG: 250 TABLET, FILM COATED ORAL at 07:55

## 2020-01-26 RX ADMIN — IPRATROPIUM BROMIDE AND ALBUTEROL SULFATE 1 AMPULE: .5; 3 SOLUTION RESPIRATORY (INHALATION) at 21:58

## 2020-01-26 RX ADMIN — ASPIRIN 325 MG: 325 TABLET, DELAYED RELEASE ORAL at 08:02

## 2020-01-26 RX ADMIN — IPRATROPIUM BROMIDE AND ALBUTEROL SULFATE 1 AMPULE: .5; 3 SOLUTION RESPIRATORY (INHALATION) at 11:30

## 2020-01-26 RX ADMIN — HYDRALAZINE HYDROCHLORIDE 25 MG: 25 TABLET, FILM COATED ORAL at 07:05

## 2020-01-26 RX ADMIN — IPRATROPIUM BROMIDE AND ALBUTEROL SULFATE 1 AMPULE: .5; 3 SOLUTION RESPIRATORY (INHALATION) at 07:40

## 2020-01-26 RX ADMIN — HYDRALAZINE HYDROCHLORIDE 25 MG: 25 TABLET, FILM COATED ORAL at 13:13

## 2020-01-26 RX ADMIN — PRAVASTATIN SODIUM 40 MG: 40 TABLET ORAL at 22:57

## 2020-01-26 ASSESSMENT — PAIN SCALES - GENERAL
PAINLEVEL_OUTOF10: 0
PAINLEVEL_OUTOF10: 0

## 2020-01-26 NOTE — PROGRESS NOTES
secondary to pre-renal azotemia in the setting of heart failure and volume depletion in the setting of pneumonia. Worsened by diuretic administration on 1/24. Sodium bicarbonate tabs. Avoid nephrotoxic medications. UA unremarkable for signs of infection. Improving slowly  7. Normocytic anemia/folic acid deficiency: Trend downwards in Hgb noted. Continue to monitor. No overt evidence of bleeding. FOBT. Iron supplementation ordered on 1/26 for 3 doses  8. Generalized weakness multifactorial related to #1, #2 and #3 and folic acid deficiency. Started on folic acid supplements  9. Essential HTN: Restart hydralazine. Continue to hold amlodipine. Reassess daily  10. Isolated elevated alkaline phosphatase  11. Right foot ulcer: Does not appear to be infected. Wound care consult  12. Severe protein calorie malnutrition: Dietitian following. Dietary supplements  13. Tobacco dependence: Smokes about 1/2 pack per day. Counseling provided and patient has not plans to quit   14. Hyperlipidemia: on pravastatin      PLAN     1. Continue to monitor creatinine. Nephrology following. Thanks for recommendations. Continue to monitor volume status while on IV fluids  2. Plan as outlined above. 3. Cardiology consulted for elevated troponin  4. Changes in liver seen on CT scan of abdomen/pelvis likely as a result of anasarca. Will get RUQ US in am  5. Monitor anemia, dilutional? IV Iron ordered. FOBT- will stop subcutaneous heparin heparin and Protonix ordered. Continue aspirin  6. Patient will need repeat CT chest in 6 weeks. Continue Ace wrap to bilateral LE. Anasarca thought to be related to low albumin level.   7. F/u on quantiferon    Anticipated Discharge in : 1-2 days     Code Status: DNR-CCA    Electronically signed by Malka Schmitz MD on 1/26/2020 at 2:22 PM      Chief Complaint     Transfer from [facility] for generalized weakness     SUBJECTIVE     The patient is a 80 y.o. female w/ a hx of tobacco dependence, essential HTN, HLD, moderate aortic stenosis and severe pulmonary HTN who was admitted for worsening shortness of breath and generalized weakness since December. The patient states that she initially had flu symptoms in December and has been persistently short of breath since then, with associated symptoms of a productive cough with worsening bilateral LE edema. She was initially evaluated at [facility] where EKG showed NSR, CXR showed \"asymmetrical hilar densities, patchy right bibasilar opacities possibly pneumonia vs post-obstructive atelactasis related to a central lobar lesion.     - Spoke to patient's daughter yesterday and she states that patient garcia shave some problems when eating- seems like she burps a lot and has difficulty with swallowing. Patient feels much better today in terms of her SOB.  Denies CP      OBJECTIVE     Medications:  Reviewed    Infusion Medications    sodium bicarbonate infusion 25 mL/hr at 01/26/20 1357     Scheduled Medications    iron sucrose  200 mg Intravenous Daily    pantoprazole  40 mg Oral QAM AC    folic acid  1 mg Oral Daily    ipratropium-albuterol  1 ampule Inhalation 4x daily    sodium chloride flush  10 mL Intravenous 2 times per day    [Held by provider] amLODIPine  10 mg Oral Daily    aspirin  325 mg Oral Daily    hydrALAZINE  25 mg Oral 3 times per day    pravastatin  40 mg Oral Daily    cefTRIAXone (ROCEPHIN) IV  1 g Intravenous Q24H    azithromycin  250 mg Oral Daily    Mucus Relief DM  1 tablet Oral BID    nicotine  1 patch Transdermal Daily    predniSONE  20 mg Oral Daily    [Held by provider] heparin (porcine)  2,000 Units Subcutaneous Q12H     PRN Meds: sodium chloride flush, magnesium hydroxide, ondansetron, acetaminophen, LORazepam    Ins and outs:      Intake/Output Summary (Last 24 hours) at 1/26/2020 1422  Last data filed at 1/26/2020 1334  Gross per 24 hour   Intake 1854.18 ml   Output --   Net 1854.18 ml       Physical Examination     /63   Pulse 71   Temp 97.5 °F (36.4 °C) (Oral)   Resp 18   Ht 5' 2\" (1.575 m)   Wt 80 lb 14.4 oz (36.7 kg)   SpO2 96%   BMI 14.80 kg/m²     General appearance: No apparent distress. HEENT: Extraocular motion intact. Neck: Supple  Respiratory:  CTA bilaterally without rales/wheezes/rhonchi. Cardiovascular: RRR with normal S1/S2 without murmurs, rubs or gallops. Abdomen: Soft, non-tender, non-distended with normal bowel sounds. Musculoskeletal: Patient is moving extremities x 4 spontaneously  Neurologic: Grossly non focal. CN: II-XII intact  Psychiatric: Alert and oriented  Vascular: Dorsalis pedis palpable bilaterally. Radial pulses palpable bilaterally. Skin:  Right foot ulcer at the base of the 4th and 5th digits that does not appear to be infected    Labs     Recent Labs     01/24/20  0407 01/25/20  0652   WBC 6.4 5.6   HGB 9.8* 8.6*   HCT 32.4* 28.6*    139     Recent Labs     01/24/20  0911 01/25/20  0652 01/26/20  0458    144 143   K 4.7 4.7 4.7    113* 111   CO2 14* 14* 16*   BUN 63* 64* 72*   CREATININE 1.6* 2.2* 2.1*   CALCIUM 8.6 8.5 8.4*     Recent Labs     01/24/20  0407   AST 28   ALT 18   BILITOT 0.3   ALKPHOS 165*     No results for input(s): INR in the last 72 hours. Recent Labs     01/24/20  0911   CKTOTAL 107       Urinalysis:      Lab Results   Component Value Date    NITRU NEGATIVE 01/24/2020    WBCUA 0-2 01/24/2020    BACTERIA NONE SEEN 01/24/2020    RBCUA 0-2 01/24/2020    BLOODU NEGATIVE 01/24/2020    SPECGRAV 1.018 03/04/2015    GLUCOSEU NEGATIVE 01/24/2020       Diagnostic imaging/procedures       Xr Chest Portable    Result Date: 1/24/2020  PROCEDURE: XR CHEST PORTABLE CLINICAL INFORMATION: shortness of breath. COMPARISON: Chest x-ray dated 7/8/2016 TECHNIQUE: AP Portable chest xray FINDINGS: Lines/tubes/devices: None Lungs/pleura: There is right middle lobe pneumonia. Lungs are hyperinflated consistent with COPD. No pleural effusion. No pneumothorax. Heart:  There is

## 2020-01-26 NOTE — PROCEDURES
A Bladder scan was performed at 1500 . Aline Pi The residual amount was measured to be 381 ML. Report of results was given to [facility].

## 2020-01-26 NOTE — CONSULTS
[address] [place] [address]                                  CONSULTATION    PATIENT NAME: Yasmeen Dominguez                  :        03/10/1933  MED REC NO:   [id number]                           ROOM:       0030  ACCOUNT NO:   [de-identified]                           ADMIT DATE: 2020  PROVIDER:     Kiersten Swan M.D.    Merly Granda:  2020    REASON FOR CONSULTATION:  We are asked to see the patient for dysphagia. HISTORY OF PRESENT ILLNESS:  The patient is an 49-year-old female who  was transferred  for pneumonia. Her main complaint was weakness, COPD exacerbation, and  she was found to have pneumonia on CAT scan. She does admit to smoking  half a pack a day. She has history of hypertension; hyperlipidemia;  peripheral vascular disease; thoracic aneurysm; and she has had carotid  stenosis, status post endarterectomy. She also had bowel obstruction  three years ago. The patient notes she has been having more trouble swallowing in the  last several months. She has lost some weight. The family notes she  has lost up to 10 pounds. The patient notes she is having trouble swallowing solids. She does  note she gets regurgitation. No hematemesis or coffee-ground emesis. CAT scan was done showing a pneumonia and also moderate dilation of the  esophagus to fluid suggestive of possible achalasia. She has never been  diagnosed with this before. She thinks she had an EGD two years ago,  but I cannot see this in the records. PAST MEDICAL HISTORY:    ALLERGIES:  To FLAGYL and BENZOATE. MEDICATIONS:  Prior to admission, Norvasc, Apresoline, Zofran, Milk of  Magnesia p.r.n., aspirin, Flonase and Pravachol. SOCIAL HISTORY:  Smoking half a pack a day. There is no alcohol. FAMILY HISTORY:  Denied family history of colon cancer. REVIEW OF SYSTEMS:  GENERAL:  No fever or chills. She has had weight  loss. be addressed. 6.  Long history of tobacco use and emphysema. Thank you for asking us to see this patient. Total time 45 minutes. NEIL Russell M.D.    D: 01/25/2020 17:38:32       T: 01/25/2020 18:33:07     [email or web address]  Job#: [id number]     Doc#: [id number]    CC:

## 2020-01-26 NOTE — CONSULTS
[facility]    Patient - Moy Manuel   MRN -  [id number]   [facility]   - 3/10/1933      Date of Admission -  2020 12:54 AM  Date of evaluation -  2020  Room - 8B--A   Hospital Day - 2  Almita Quiroz MD Primary Care Physician - Manuela Young MD   Chief Complaint   Weakness, confusion  Active Hospital Problem List      Active Hospital Problems    Diagnosis Date Noted    Community acquired bacterial pneumonia [J15.9]     COPD exacerbation ([address] [J44.1]     Acute on chronic heart failure with preserved ejection fraction (HCC) [I50.33]     Moderate aortic stenosis [I35.0]     Moderate to severe pulmonary hypertension (HCC) [I27.20]     Bilateral leg edema [R60.0]     Collapse of right lung [J98.11]     Dysphagia [R13.10]     Acute kidney injury ([address] [Q90.8]     Metabolic acidosis [Y91.0]     Normocytic anemia [R59.0]     Folic acid deficiency [V06.9]     Elevated alkaline phosphatase level [R74.8]     Ulcer of right foot ([address] [L97.519]     Tobacco dependence [F17.200]     Severe protein-calorie malnutrition ([facility] [E43] 2020    Generalized weakness [R53.1] 2020     OTF Manuel is a 80 y.o. female every day smoker, weak, poor appetite, cough, SOB at home evaluated by PCP Dr Siddharth Rios and asked to go to ED at [facility] for abnormal labs and from there transferred to [facility]. CXR and CT chest: old calcified granulomatous disease, RML atelectasis/infiltrate, patchy RLL infiltrates. Some confusion and cognitive deficiencies worsening over time, h/o carotid artery disease s/p endarterectomy  Started smoking at age 28, continues smoking a \"few a day\"  Worked for [employer] for 35 years, exposed to multiple chemicals  Denies having COPD, does not use inhalers or supplemental oxygen no PFTs in Epic.   ROS + for SHAW, congested cough, sputum production, wheezing, LE edema  BNP 36K, ECHO: bi atrial enlargement, EF Social History Narrative    Not on file     Meds    Current Medications    iron sucrose  200 mg Intravenous Daily    folic acid  1 mg Oral Daily    ipratropium-albuterol  1 ampule Inhalation 4x daily    sodium chloride flush  10 mL Intravenous 2 times per day    [Held by provider] amLODIPine  10 mg Oral Daily    aspirin  325 mg Oral Daily    hydrALAZINE  25 mg Oral 3 times per day    pravastatin  40 mg Oral Daily    cefTRIAXone (ROCEPHIN) IV  1 g Intravenous Q24H    azithromycin  250 mg Oral Daily    Mucus Relief DM  1 tablet Oral BID    nicotine  1 patch Transdermal Daily    predniSONE  20 mg Oral Daily    heparin (porcine)  2,000 Units Subcutaneous Q12H     sodium chloride flush, magnesium hydroxide, ondansetron, acetaminophen, LORazepam  IV Drips/Infusions   sodium bicarbonate infusion 35 mL/hr at 01/25/20 2337     Vitals    Vitals    height is 5' 2\" (1.575 m) and weight is 80 lb 14.4 oz (36.7 kg). Her oral temperature is 97.5 °F (36.4 °C). Her blood pressure is 79/55 (abnormal) and her pulse is 71. Her respiration is 18 and oxygen saturation is 96%. I/O    Intake/Output Summary (Last 24 hours) at 1/26/2020 1210  Last data filed at 1/26/2020 0403  Gross per 24 hour   Intake 1093.18 ml   Output --   Net 1093.18 ml     Patient Vitals for the past 96 hrs (Last 3 readings):   Weight   01/26/20 0403 80 lb 14.4 oz (36.7 kg)   01/24/20 0900 75 lb (34 kg)     Exam   Constitutional: Elderly lady, severely malnourished,BMI 15, on supplemental oxygen, able to answer simple questions. Head: Normocephalic and atraumatic. Mouth/Throat: Oropharynx is clear and moist.  No oral thrush. Eyes: Conjunctivae are normal. Pupils are equal, round, and reactive to light. No scleral icterus. Neck: JVD  Cardiovascular: Regular rate, regular rhythm, S1 and S2 with no murmur. No peripheral edema  Pulmonary/Chest: Coarse rhonchi   Abdominal: Soft. Bowel sounds audible.  No distension or tenderness to palp  Musculoskeletal: Edema  Lymphadenopathy:  No cervical adenopathy. Neurological: Patient is alert and oriented to person, place, and time. Skin: Skin is warm and dry. Labs   ABG  No results found for: PH, PO2, PCO2, HCO3, O2SAT  No results found for: IFIO2, MODE, SETTIDVOL, SETPEEP  CBC  Recent Labs     01/24/20  0407 01/25/20  0652   WBC 6.4 5.6   RBC 3.61* 3.21*   HGB 9.8* 8.6*   HCT 32.4* 28.6*   MCV 89.8 89.1   MCH 27.1 26.8   MCHC 30.2* 30.1*    139   MPV 10.9 11.2      BMP  Recent Labs     01/24/20  0911 01/25/20  0652 01/26/20  0458    144 143   K 4.7 4.7 4.7    113* 111   CO2 14* 14* 16*   BUN 63* 64* 72*   CREATININE 1.6* 2.2* 2.1*   GLUCOSE 72 74 98   MG  --  2.1 2.2   CALCIUM 8.6 8.5 8.4*     LFT  Recent Labs     01/24/20  0407   AST 28   ALT 18   BILITOT 0.3   ALKPHOS 165*     TROP  Lab Results   Component Value Date    TROPONINT 0.102 01/26/2020    TROPONINT 0.073 01/25/2020    TROPONINT < 0.010 07/15/2016     BNP  Lab Results   Component Value Date    PROBNP 30091.0 01/24/2020    PROBNP 4630.0 07/15/2016       Recent Labs     01/24/20  0950   PHUR 5.0   COLORU YELLOW   PROTEINU 30*   BLOODU NEGATIVE   RBCUA 0-2   WBCUA 0-2   BACTERIA NONE SEEN   NITRU NEGATIVE   GLUCOSEU NEGATIVE   BILIRUBINUR NEGATIVE   UROBILINOGEN 0.2   KETUA NEGATIVE   . PFTs   N/A  Echo    Reviewed from [facility]  Cultures    Procalcitonin  Lab Results   Component Value Date    PROCAL 0.25 01/24/2020        Radiology    CXR  FINDINGS:   Lines/tubes/devices: None   Lungs/pleura: Sandria Passy is right middle lobe pneumonia. Lungs are hyperinflated consistent with COPD. No pleural effusion. No pneumothorax. Heart: There is mild cardiomegaly, new compared to the prior study. Mediastinum/andrew: No obvious mass or adenopathy. Aorta is tortuous. Skeleton: No significant bone or joint abnormality. Other: There are again left neck surgical clips.           Impression       Right middle lobe pneumonia. COPD.   Mild cardiomegaly.           **This report has been created using voice recognition software. It may contain minor errors which are inherent in voice recognition technology. **       Final report electronically signed by Dr. Carol English on 1/24/2020 5:39 AM       CT Scans  FINDINGS:        Upper abdomen: Prior cholecystectomy. Densely calcified abdominal aorta is mildly aneurysmal, 3.1 cm.       Mediastinum and andrew: Pulmonary arteries are prominent, consistent with pulmonary arterial hypertension. A few calcified lymph nodes are present in the right hilum. The previously a few slightly prominent subcarinal lymph nodes. Note that there is    moderate diffuse fluid distention of the esophagus with a diameter of 3.1 cm and evidence for an air-fluid level superiorly. Cannot exclude achalasia. Note that there is a saccular aneurysm along the inferior aspect of the aorta at the aorticopulmonary    window, approximately 2.9 x 4.2 cm. Despite this aneurysm, the maximal aortic diameter in the coronal plane is only 3.8 cm.       Bones: There is extensive calcification in the thoracic aorta.       Lungs: Small pleural effusion right side. There is mild atelectasis/pneumonia in the right lung bases. Addition, there appears to be almost total collapse of the right middle lobe which could possibly be related to mucous plug. No distinct mass lesion is    seen.           Impression   1. 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 See comments above. Fusiform 3.1 cm upper abdominal aortic aneurysm. Extensive dense calcification throughout the thoracic and abdominal aorta. 2. Mild right basilar atelectasis/pneumonia. Near total collapse of the right middle lobe which could be due to mucous plug. An obstructing mass lesion is not seen. Small effusion right side. 3. Moderate diffuse fluid distention of the esophagus. Cannot exclude achalasia.  A barium esophagram might be considered for further

## 2020-01-27 ENCOUNTER — APPOINTMENT (OUTPATIENT)
Dept: GENERAL RADIOLOGY | Age: 85
DRG: 193 | End: 2020-01-27
Attending: FAMILY MEDICINE
Payer: MEDICARE

## 2020-01-27 LAB
ALLEN TEST: POSITIVE
BASE EXCESS (CALCULATED): -5.1 MMOL/L (ref -2.5–2.5)
CHLORIDE, URINE: 23 MEQ/L
COLLECTED BY:: ABNORMAL
CREATININE URINE: 50.5 MG/DL
DEVICE: ABNORMAL
EKG ATRIAL RATE: 83 BPM
EKG P AXIS: 83 DEGREES
EKG P-R INTERVAL: 158 MS
EKG Q-T INTERVAL: 368 MS
EKG QRS DURATION: 90 MS
EKG QTC CALCULATION (BAZETT): 405 MS
EKG R AXIS: 84 DEGREES
EKG T AXIS: -74 DEGREES
EKG VENTRICULAR RATE: 73 BPM
HCO3: 20 MMOL/L (ref 23–28)
O2 SATURATION: 92 %
PCO2: 37 MMHG (ref 35–45)
PH BLOOD GAS: 7.34 (ref 7.35–7.45)
PO2: 66 MMHG (ref 71–104)
PROT/CREAT RATIO, UR: 0.61
PROTEIN, URINE: 30.9 MG/DL
SODIUM URINE: 38 MEQ/L
SOURCE, BLOOD GAS: ABNORMAL

## 2020-01-27 PROCEDURE — APPSS30 APP SPLIT SHARED TIME 16-30 MINUTES: Performed by: NURSE PRACTITIONER

## 2020-01-27 PROCEDURE — 74220 X-RAY XM ESOPHAGUS 1CNTRST: CPT

## 2020-01-27 PROCEDURE — 97162 PT EVAL MOD COMPLEX 30 MIN: CPT

## 2020-01-27 PROCEDURE — 1200000003 HC TELEMETRY R&B

## 2020-01-27 PROCEDURE — 99233 SBSQ HOSP IP/OBS HIGH 50: CPT | Performed by: INTERNAL MEDICINE

## 2020-01-27 PROCEDURE — 6370000000 HC RX 637 (ALT 250 FOR IP): Performed by: INTERNAL MEDICINE

## 2020-01-27 PROCEDURE — 84300 ASSAY OF URINE SODIUM: CPT

## 2020-01-27 PROCEDURE — 2580000003 HC RX 258: Performed by: PHYSICIAN ASSISTANT

## 2020-01-27 PROCEDURE — 99232 SBSQ HOSP IP/OBS MODERATE 35: CPT | Performed by: INTERNAL MEDICINE

## 2020-01-27 PROCEDURE — 2700000000 HC OXYGEN THERAPY PER DAY

## 2020-01-27 PROCEDURE — 2709999900 HC NON-CHARGEABLE SUPPLY

## 2020-01-27 PROCEDURE — 36600 WITHDRAWAL OF ARTERIAL BLOOD: CPT

## 2020-01-27 PROCEDURE — 6360000002 HC RX W HCPCS: Performed by: PHYSICIAN ASSISTANT

## 2020-01-27 PROCEDURE — 94640 AIRWAY INHALATION TREATMENT: CPT

## 2020-01-27 PROCEDURE — 6370000000 HC RX 637 (ALT 250 FOR IP): Performed by: PHYSICIAN ASSISTANT

## 2020-01-27 PROCEDURE — 94760 N-INVAS EAR/PLS OXIMETRY 1: CPT

## 2020-01-27 PROCEDURE — 6360000002 HC RX W HCPCS: Performed by: NURSE PRACTITIONER

## 2020-01-27 PROCEDURE — 97110 THERAPEUTIC EXERCISES: CPT

## 2020-01-27 PROCEDURE — 94669 MECHANICAL CHEST WALL OSCILL: CPT

## 2020-01-27 PROCEDURE — 71045 X-RAY EXAM CHEST 1 VIEW: CPT

## 2020-01-27 PROCEDURE — 82570 ASSAY OF URINE CREATININE: CPT

## 2020-01-27 PROCEDURE — 99223 1ST HOSP IP/OBS HIGH 75: CPT | Performed by: INTERNAL MEDICINE

## 2020-01-27 PROCEDURE — 84156 ASSAY OF PROTEIN URINE: CPT

## 2020-01-27 PROCEDURE — 6360000004 HC RX CONTRAST MEDICATION: Performed by: INTERNAL MEDICINE

## 2020-01-27 PROCEDURE — A4641 RADIOPHARM DX AGENT NOC: HCPCS | Performed by: INTERNAL MEDICINE

## 2020-01-27 PROCEDURE — 82803 BLOOD GASES ANY COMBINATION: CPT

## 2020-01-27 PROCEDURE — 82436 ASSAY OF URINE CHLORIDE: CPT

## 2020-01-27 RX ORDER — FUROSEMIDE 10 MG/ML
30 INJECTION INTRAMUSCULAR; INTRAVENOUS ONCE
Status: DISCONTINUED | OUTPATIENT
Start: 2020-01-27 | End: 2020-01-27

## 2020-01-27 RX ORDER — HEPARIN SODIUM 1000 [USP'U]/ML
60 INJECTION, SOLUTION INTRAVENOUS; SUBCUTANEOUS ONCE
Status: DISCONTINUED | OUTPATIENT
Start: 2020-01-27 | End: 2020-01-27

## 2020-01-27 RX ORDER — PANTOPRAZOLE SODIUM 40 MG/10ML
40 INJECTION, POWDER, LYOPHILIZED, FOR SOLUTION INTRAVENOUS DAILY
Status: DISCONTINUED | OUTPATIENT
Start: 2020-01-28 | End: 2020-01-27

## 2020-01-27 RX ORDER — CLOPIDOGREL 300 MG/1
600 TABLET, FILM COATED ORAL ONCE
Status: COMPLETED | OUTPATIENT
Start: 2020-01-27 | End: 2020-01-27

## 2020-01-27 RX ORDER — ASPIRIN 81 MG/1
81 TABLET ORAL DAILY
Status: DISCONTINUED | OUTPATIENT
Start: 2020-01-28 | End: 2020-01-28 | Stop reason: HOSPADM

## 2020-01-27 RX ORDER — CEFDINIR 300 MG/1
300 CAPSULE ORAL EVERY 12 HOURS SCHEDULED
Status: DISCONTINUED | OUTPATIENT
Start: 2020-01-27 | End: 2020-01-28

## 2020-01-27 RX ORDER — FUROSEMIDE 10 MG/ML
40 INJECTION INTRAMUSCULAR; INTRAVENOUS 2 TIMES DAILY
Status: DISCONTINUED | OUTPATIENT
Start: 2020-01-27 | End: 2020-01-27

## 2020-01-27 RX ORDER — BUMETANIDE 1 MG/1
1 TABLET ORAL DAILY
Status: DISCONTINUED | OUTPATIENT
Start: 2020-01-27 | End: 2020-01-28 | Stop reason: HOSPADM

## 2020-01-27 RX ORDER — HEPARIN SODIUM 1000 [USP'U]/ML
30 INJECTION, SOLUTION INTRAVENOUS; SUBCUTANEOUS PRN
Status: DISCONTINUED | OUTPATIENT
Start: 2020-01-27 | End: 2020-01-27 | Stop reason: ALTCHOICE

## 2020-01-27 RX ORDER — BUDESONIDE 0.5 MG/2ML
0.5 INHALANT ORAL 2 TIMES DAILY
Status: DISCONTINUED | OUTPATIENT
Start: 2020-01-27 | End: 2020-01-28 | Stop reason: HOSPADM

## 2020-01-27 RX ORDER — PANTOPRAZOLE SODIUM 40 MG/1
40 TABLET, DELAYED RELEASE ORAL 2 TIMES DAILY
Status: DISCONTINUED | OUTPATIENT
Start: 2020-01-27 | End: 2020-01-28 | Stop reason: HOSPADM

## 2020-01-27 RX ORDER — HEPARIN SODIUM 10000 [USP'U]/100ML
12 INJECTION, SOLUTION INTRAVENOUS CONTINUOUS
Status: DISCONTINUED | OUTPATIENT
Start: 2020-01-27 | End: 2020-01-27

## 2020-01-27 RX ORDER — CLOPIDOGREL BISULFATE 75 MG/1
75 TABLET ORAL DAILY
Status: DISCONTINUED | OUTPATIENT
Start: 2020-01-28 | End: 2020-01-27

## 2020-01-27 RX ORDER — HEPARIN SODIUM 1000 [USP'U]/ML
60 INJECTION, SOLUTION INTRAVENOUS; SUBCUTANEOUS PRN
Status: DISCONTINUED | OUTPATIENT
Start: 2020-01-27 | End: 2020-01-27 | Stop reason: ALTCHOICE

## 2020-01-27 RX ORDER — FUROSEMIDE 10 MG/ML
20 INJECTION INTRAMUSCULAR; INTRAVENOUS ONCE
Status: DISCONTINUED | OUTPATIENT
Start: 2020-01-27 | End: 2020-01-27

## 2020-01-27 RX ADMIN — IPRATROPIUM BROMIDE AND ALBUTEROL SULFATE 1 AMPULE: .5; 3 SOLUTION RESPIRATORY (INHALATION) at 17:08

## 2020-01-27 RX ADMIN — FOLIC ACID 1 MG: 1 TABLET ORAL at 10:35

## 2020-01-27 RX ADMIN — ASPIRIN 325 MG: 325 TABLET, DELAYED RELEASE ORAL at 10:35

## 2020-01-27 RX ADMIN — BARIUM SULFATE 50 ML: 0.6 SUSPENSION ORAL at 08:57

## 2020-01-27 RX ADMIN — PANTOPRAZOLE SODIUM 40 MG: 40 TABLET, DELAYED RELEASE ORAL at 21:17

## 2020-01-27 RX ADMIN — IPRATROPIUM BROMIDE AND ALBUTEROL SULFATE 1 AMPULE: .5; 3 SOLUTION RESPIRATORY (INHALATION) at 13:12

## 2020-01-27 RX ADMIN — GUAIFENESIN AND DEXTROMETHORPHAN HYDROBROMIDE 1 TABLET: 600; 30 TABLET, EXTENDED RELEASE ORAL at 10:35

## 2020-01-27 RX ADMIN — PREDNISONE 20 MG: 20 TABLET ORAL at 10:35

## 2020-01-27 RX ADMIN — HYDRALAZINE HYDROCHLORIDE 25 MG: 25 TABLET, FILM COATED ORAL at 21:14

## 2020-01-27 RX ADMIN — HYDRALAZINE HYDROCHLORIDE 25 MG: 25 TABLET, FILM COATED ORAL at 06:21

## 2020-01-27 RX ADMIN — AZITHROMYCIN 250 MG: 250 TABLET, FILM COATED ORAL at 10:35

## 2020-01-27 RX ADMIN — IPRATROPIUM BROMIDE AND ALBUTEROL SULFATE 1 AMPULE: .5; 3 SOLUTION RESPIRATORY (INHALATION) at 21:20

## 2020-01-27 RX ADMIN — IPRATROPIUM BROMIDE AND ALBUTEROL SULFATE 1 AMPULE: .5; 3 SOLUTION RESPIRATORY (INHALATION) at 08:19

## 2020-01-27 RX ADMIN — BUDESONIDE 500 MCG: 0.5 INHALANT RESPIRATORY (INHALATION) at 17:09

## 2020-01-27 RX ADMIN — GUAIFENESIN AND DEXTROMETHORPHAN HYDROBROMIDE 1 TABLET: 600; 30 TABLET, EXTENDED RELEASE ORAL at 21:14

## 2020-01-27 RX ADMIN — METOPROLOL TARTRATE 25 MG: 25 TABLET ORAL at 14:36

## 2020-01-27 RX ADMIN — HYDRALAZINE HYDROCHLORIDE 25 MG: 25 TABLET, FILM COATED ORAL at 14:38

## 2020-01-27 RX ADMIN — PRAVASTATIN SODIUM 40 MG: 40 TABLET ORAL at 21:14

## 2020-01-27 RX ADMIN — CEFDINIR 300 MG: 300 CAPSULE ORAL at 21:17

## 2020-01-27 RX ADMIN — BUMETANIDE 1 MG: 1 TABLET ORAL at 14:36

## 2020-01-27 RX ADMIN — CLOPIDOGREL BISULFATE 600 MG: 300 TABLET, FILM COATED ORAL at 14:36

## 2020-01-27 RX ADMIN — PANTOPRAZOLE SODIUM 40 MG: 40 TABLET, DELAYED RELEASE ORAL at 06:21

## 2020-01-27 RX ADMIN — METOPROLOL TARTRATE 25 MG: 25 TABLET ORAL at 21:14

## 2020-01-27 RX ADMIN — CEFTRIAXONE SODIUM 1 G: 1 INJECTION, POWDER, FOR SOLUTION INTRAMUSCULAR; INTRAVENOUS at 10:35

## 2020-01-27 ASSESSMENT — PULMONARY FUNCTION TESTS: PEFR_L/MIN: 100

## 2020-01-27 ASSESSMENT — PAIN SCALES - GENERAL
PAINLEVEL_OUTOF10: 0

## 2020-01-27 NOTE — PROCEDURES
A Bladder scan was performed at 2311 . The patient's last void was at 2310 . The residual amount was measured to be 341 ML. Report of results was given to [facility].

## 2020-01-27 NOTE — PROGRESS NOTES
Hospitalist Progress Note    Patient:  Sohan Silverio  YOB: 1933  MRN: [id number]   PCP: Gilmer Hogan MD         Acct: [de-identified]  Unit/Bed: [facility]    Date of Admission: 1/24/2020      ASSESSMENT   1. Community acquired bacterial pneumonia associated with mild COPD exacerbation. Procalcitonin increased. Continue breathing treatments, Mucinex DM, Acapella. Blood cultures ordered. Strep pneumonia antigen negative. Patient respiratory status started to improve but acutely worsened over the last 2 days likely related to recurrence of heart failure. Stop steroids today  2. Mild to moderate acute on chronic heart failure with preserved EF in the setting of moderate aortic stenosis, severe pulmonary HTN, with bilateral LE edema. Evidence of anasarca with ascites as well : multifactorial bilateral LE edema- thought to be related to combination of low albumin, heart failure in the setting of moderate aortic stenosis. Possibly related to use of amlodipine so currently held. S/p Albumin/Lasix. Last echo on 1/23 showed EF of 50-55% with moderate aortic stenosis with JOSÉ of 1.3 cm2, moderate mitral regurgitation and moderate TR and RVSP of 70 to 75 mmHg. Patient however dehydrated at the same time as being fluid overloaded so hydrated with fluids, but started to experience volume overload. Will give Lasix today. Continue Ace wrap to the bilateral LE  3. Right middle lobe collapse with question of mucous plug: Pulmonology consulted. Will plan for repeat CT chest in 6 weeks- and will determine if bronchoscopy is necessary after this. Gold quantiferon ordered per Pulmonology and no need for isolation precautions. Recommend home O2 evaluation  4. Elevated troponin: Possible contributors include demand ischemia in the setting of worsening anemia, worsening  CKD. Patient denies CP. Cardiology consulted   5. Dysphagia with evidence for possible esophageal achalasia: GI consulted. ST consulted.  No for palliative management of shortness of breath and insert Rodrigues catheter for urinary retention and patient to f/u with Urology as an outpatient  4. Continue antibiotics  5. Cardiology consulted for elevated troponin and patient currently on aspirin, Plavix and to be started on a heparin drip. From my standpoint as the hospitalist, hesitant to start heparin or Plavix in this patient as there are signs that she is bleeding from the GI trac- likely catalyzed by the administration of prophylactic heparin and prednisone in the last few days. D;c steroids  6. Changes in liver thought to be hepatic congestion as a result of ongoing anasarca. 7. Patient will need repeat CT chest in 6 weeks if she is agreeable to this. Continue Ace wrap to bilateral LE. Anasarca thought to be related to low albumin level and heart failure in the setting of moderate aortic stenosis- patient likely not a suitable candidate for surgical intervention  8. Discontinue amlodipine as BP has remained stable off of it  9. F/u on quantiferon    Anticipated Discharge in : 1-2 days     Code Status: DNR-CCA    Electronically signed by Nasir Montelongo MD on 1/27/2020 at 1:02 PM      Chief Complaint     Transfer from [facility] for generalized weakness     SUBJECTIVE     The patient is a 80 y.o. female w/ a hx of tobacco dependence, essential HTN, HLD, moderate aortic stenosis and severe pulmonary HTN who was admitted for worsening shortness of breath and generalized weakness since December. The patient states that she initially had flu symptoms in December and has been persistently short of breath since then, with associated symptoms of a productive cough with worsening bilateral LE edema.  She was initially evaluated at [facility] where EKG showed NSR, CXR showed \"asymmetrical hilar densities, patchy right bibasilar opacities possibly pneumonia vs post-obstructive atelactasis related to a central lobar lesion.     - Patient states that her  SOB breathing  Cardiovascular: RRR. 3/6 POONAM. No rubs or gallops. Abdomen: Soft, non-tender, non-distended with normal bowel sounds. Musculoskeletal: Patient is moving extremities x 4 spontaneously  Neurologic: Grossly non focal. CN: II-XII intact. Generalized weakness  Psychiatric: Alert and oriented  Vascular: Dorsalis pedis palpable bilaterally. Radial pulses palpable bilaterally. Bilateral LE peripheral edema- bilateral LE in ACE wrap  Skin:  Right foot ulcer at the base of the 4th and 5th digits that does not appear to be infected    Labs     Recent Labs     01/25/20  0652   WBC 5.6   HGB 8.6*   HCT 28.6*        Recent Labs     01/25/20  0652 01/26/20  0458    143   K 4.7 4.7   * 111   CO2 14* 16*   BUN 64* 72*   CREATININE 2.2* 2.1*   CALCIUM 8.5 8.4*     No results for input(s): AST, ALT, BILIDIR, BILITOT, ALKPHOS in the last 72 hours. No results for input(s): INR in the last 72 hours. No results for input(s): Blossom Medici in the last 72 hours. Urinalysis:      Lab Results   Component Value Date    NITRU NEGATIVE 01/24/2020    WBCUA 0-2 01/24/2020    BACTERIA NONE SEEN 01/24/2020    RBCUA 0-2 01/24/2020    BLOODU NEGATIVE 01/24/2020    SPECGRAV 1.018 03/04/2015    GLUCOSEU NEGATIVE 01/24/2020       Diagnostic imaging/procedures       Xr Chest Portable    Result Date: 1/24/2020  PROCEDURE: XR CHEST PORTABLE CLINICAL INFORMATION: shortness of breath. COMPARISON: Chest x-ray dated 7/8/2016 TECHNIQUE: AP Portable chest xray FINDINGS: Lines/tubes/devices: None Lungs/pleura: There is right middle lobe pneumonia. Lungs are hyperinflated consistent with COPD. No pleural effusion. No pneumothorax. Heart: There is mild cardiomegaly, new compared to the prior study. Mediastinum/andrew: No obvious mass or adenopathy. Aorta is tortuous. Skeleton: No significant bone or joint abnormality. Other: There are again left neck surgical clips. Right middle lobe pneumonia. COPD. Mild cardiomegaly. **This report has been created using voice recognition software. It may contain minor errors which are inherent in voice recognition technology. ** Final report electronically signed by Dr. Román Dominique on 1/24/2020 5:39 AM    Xr Comparison Of Outside Films    Result Date: 1/24/2020  Radiology exam is complete. No Radiologist dictation. Please follow up with ordering provider.        DVT prophylaxis: [x] Lovenox                                 [] SCDs                                 [] SQ Heparin                                 [] Encourage ambulation           [] Already on Anticoagulation     Disposition:    [x] Home       [] TCU       [] Rehab       [] Psych       [] SNF       [] [place]       [] Other-

## 2020-01-27 NOTE — PROGRESS NOTES
[facility]   Nephrology progress note  1/27/2020, 12:07 PM      Pt Name:    Eliu Toledo  MRN:     [id number]     [place]:    3/10/1933  Admit Date:    1/24/2020 12:54 AM  Primary Care Physician:  Dash Enriquez MD   Room number  8B-30/030-A    Chief Complaint: Nephrology following for JACKIE/CKD    Subjective:  Patient seen and examined  No chest pain  Reports some shortness of breath at times  On nasal cannula    Objective:  24HR INTAKE/OUTPUT:      Intake/Output Summary (Last 24 hours) at 1/27/2020 1207  Last data filed at 1/27/2020 0431  Gross per 24 hour   Intake 1215.6 ml   Output --   Net 1215.6 ml     I/O last 3 completed shifts: In: 1215.6 [P.O.:450; I.V.:765.6]  Out: -   No intake/output data recorded. Admission weight: 75 lb (34 kg)  Wt Readings from Last 3 Encounters:   01/27/20 81 lb 8 oz (37 kg)   07/15/16 105 lb (47.6 kg)   07/09/16 105 lb 11.2 oz (47.9 kg)     Body mass index is 14.91 kg/m². Physical examination  VITALS:     Vitals:    01/27/20 1207   BP: (!) 121/55   Pulse: 73   Resp: 16   Temp: 97.6 °F (36.4 °C)   SpO2: 99%     General Appearance: alert and cooperative with exam, appears comfortable, no distress, cachectic and underweight, chronically ill-appearing  Mouth/Throat: Oral mucosa moist  Neck: No JVD  Lungs: no use of accessory muscles, poor effort. Decreased lung entry at the bases  Heart:  S1, S2 heard  GI: soft, non-tender, no guarding  Extremities: Trace LE edema      Lab Data  CBC:   Recent Labs     01/25/20  0652   WBC 5.6   HGB 8.6*   HCT 28.6*        BMP:  Recent Labs     01/25/20  0652 01/26/20  0458    143   K 4.7 4.7   * 111   CO2 14* 16*   BUN 64* 72*   CREATININE 2.2* 2.1*   GLUCOSE 74 98   CALCIUM 8.5 8.4*   MG 2.1 2.2     Hepatic:   No results for input(s): LABALBU, AST, ALT, ALB, BILITOT, ALKPHOS in the last 72 hours.       Meds:  Infusion:    heparin (porcine)      sodium bicarbonate infusion 25 mL/hr at 01/26/20 1356 Meds:    heparin (porcine)  60 Units/kg Intravenous Once    furosemide  40 mg Intravenous BID    metoprolol tartrate  25 mg Oral BID    clopidogrel  600 mg Oral Once    [START ON 1/28/2020] clopidogrel  75 mg Oral Daily    iron sucrose  200 mg Intravenous Daily    pantoprazole  40 mg Oral QAM AC    folic acid  1 mg Oral Daily    ipratropium-albuterol  1 ampule Inhalation 4x daily    sodium chloride flush  10 mL Intravenous 2 times per day    [Held by provider] amLODIPine  10 mg Oral Daily    aspirin  325 mg Oral Daily    hydrALAZINE  25 mg Oral 3 times per day    pravastatin  40 mg Oral Daily    cefTRIAXone (ROCEPHIN) IV  1 g Intravenous Q24H    azithromycin  250 mg Oral Daily    Mucus Relief DM  1 tablet Oral BID    nicotine  1 patch Transdermal Daily    predniSONE  20 mg Oral Daily     Meds prn: heparin (porcine), heparin (porcine), sodium chloride flush, magnesium hydroxide, ondansetron, acetaminophen, LORazepam       Impression and Plan:  1. JACKIE on CKD 3. No new labs available  - check BMP  2. Shortness of breath with pleural effusion: Continue with IV Lasix as already ordered  3. Mild ascites with bilateral pleural effusion as well as possible hepatocellular dysfunction. May need further evaluation. 4.  Peripheral edema with ascites. 5.  Large right pleural effusion  6. Possible hepatocellular dysfunction  7. Distended esophagus, achalasia?:  GI following await further recommendations  8. Severe protein calorie malnutrition and weight loss  9. History of smoking and tobacco use  10. Acid-base disorder. Mild metabolic acidosis. ABG reviewed. Overall well compensated. 11.  Metabolic acidosis: IV loop diuretics should also help improve metabolic acidosis.   Will follow    D/W patient and RN    Devi Parker MD  [facility]

## 2020-01-27 NOTE — PROGRESS NOTES
[facility]    Patient - Ricardo Davis   MRN -  [id number]   [facility]   - 3/10/1933      Date of Admission -  2020 12:54 AM  Date of evaluation -  2020  Room - 8B--A   Hospital Day - 3  Ángela Castañeda MD Primary Care Physician - Jo Neri MD   Reason for consult   RML collapse  Active Hospital Problem List      Active Hospital Problems    Diagnosis Date Noted    Achalasia, esophageal [K22.0]     Esophageal stenosis [K22.2]     Urinary retention [R33.9]     Hepatic steatosis [K76.0]     Elevated troponin [R79.89]     Community acquired bacterial pneumonia [J15.9]     COPD exacerbation ([address] [J44.1]     Acute on chronic heart failure with preserved ejection fraction (HCC) [I50.33]     Moderate aortic stenosis [I35.0]     Moderate to severe pulmonary hypertension (HCC) [I27.20]     Bilateral leg edema [R60.0]     Collapse of right lung [J98.11]     Dysphagia [R13.10]     Acute kidney injury ([address] [I42.4]     Metabolic acidosis [I21.0]     Normocytic anemia [E10.8]     Folic acid deficiency [R83.2]     Elevated alkaline phosphatase level [R74.8]     Ulcer of right foot ([facility] [L97.519]     Tobacco dependence [F17.200]     Severe protein-calorie malnutrition ([facility] [E43] 2020    Generalized weakness [R53.1] 2020     OTF Davis is a 80 y.o. female every day smoker, weak, poor appetite, cough, SOB at home evaluated by PCP Dr Ty Griffin and asked to go to ED at [facility] [place] for abnormal labs and from there transferred to [address] [place] [address]. CXR and CT chest: old calcified granulomatous disease, RML atelectasis/infiltrate, patchy RLL infiltrates.   Some confusion and cognitive deficiencies worsening over time, h/o carotid artery disease s/p endarterectomy  Started smoking at age 28, continues smoking a \"few a day\"  Worked for [employer] for 35 years, exposed to multiple chemicals  Denies having COPD, does not use inhalers or supplemental oxygen no PFTs in Epic. ROS + for SHAW, congested cough, sputum production, wheezing, LE edema  BNP 36K, ECHO: bi atrial enlargement, EF 55%    Events last 24h   -On 2 LPM via NC  -Afebrile  -ABG Metabolic acidosis   All other systems reviewed  Past Surgical History           Procedure Laterality Date    ABDOMINAL EXPLORATION SURGERY  07/15/2016    Exploratory Laporatomy, Lysis of Adhesions, reduction internal hernia by Dr. Hankins Moder      mass removed from lower leg     Diet    Dietary Nutrition Supplements: Standard High Calorie Oral Supplement  DIET FULL LIQUID; No Added Salt (3-4 GM); No Drinking Straw  Allergies    Flagyl [metronidazole]  Social History     Social History     Socioeconomic History    Marital status:       Spouse name: Not on file    Number of children: Not on file    Years of education: Not on file    Highest education level: Not on file   Occupational History    Not on file   Social Needs    Financial resource strain: Not on file    Food insecurity:     Worry: Not on file     Inability: Not on file    Transportation needs:     Medical: Not on file     Non-medical: Not on file   Tobacco Use    Smoking status: Current Every Day Smoker     Packs/day: 0.50    Smokeless tobacco: Never Used   Substance and Sexual Activity    Alcohol use: Yes     Comment: rarely    Drug use: No    Sexual activity: Not on file   Lifestyle    Physical activity:     Days per week: Not on file     Minutes per session: Not on file    Stress: Not on file   Relationships    Social connections:     Talks on phone: Not on file     Gets together: Not on file     Attends Quaker service: Not on file     Active member of club or organization: Not on file     Attends meetings of clubs or organizations: Not on file     Relationship status: Not on file    Intimate partner violence:     Fear of current or ex partner: Not on file     Emotionally abused: Not on file     Physically abused: Not on file     Forced sexual activity: Not on file   Other Topics Concern    Not on file   Social History Narrative    Not on file     Meds    Current Medications    metoprolol tartrate  25 mg Oral BID    [START ON 1/28/2020] clopidogrel  75 mg Oral Daily    [START ON 1/28/2020] aspirin  81 mg Oral Daily    cefdinir  300 mg Oral 2 times per day    bumetanide  1 mg Oral Daily    pantoprazole  40 mg Oral BID    iron sucrose  200 mg Intravenous Daily    folic acid  1 mg Oral Daily    ipratropium-albuterol  1 ampule Inhalation 4x daily    sodium chloride flush  10 mL Intravenous 2 times per day    hydrALAZINE  25 mg Oral 3 times per day    pravastatin  40 mg Oral Daily    azithromycin  250 mg Oral Daily    Mucus Relief DM  1 tablet Oral BID    nicotine  1 patch Transdermal Daily     heparin (porcine), heparin (porcine), sodium chloride flush, magnesium hydroxide, ondansetron, acetaminophen, LORazepam  IV Drips/Infusions   heparin (porcine)       Vitals    Vitals    height is 5' 2\" (1.575 m) and weight is 81 lb 8 oz (37 kg). Her oral temperature is 97.6 °F (36.4 °C). Her blood pressure is 121/55 (abnormal) and her pulse is 73. Her respiration is 16 and oxygen saturation is 99%. O2 Flow Rate (L/min): 2 L/min  I/O    Intake/Output Summary (Last 24 hours) at 1/27/2020 1526  Last data filed at 1/27/2020 1459  Gross per 24 hour   Intake 679.56 ml   Output --   Net 679.56 ml     Patient Vitals for the past 96 hrs (Last 3 readings):   Weight   01/27/20 0358 81 lb 8 oz (37 kg)   01/26/20 0403 80 lb 14.4 oz (36.7 kg)   01/24/20 0900 75 lb (34 kg)     Exam   Physical Exam   Constitutional: No distress on 2 LPM O2 per NC. Patient appears elderly and chronically ill. Head: Normocephalic and atraumatic. Mouth/Throat: Oropharynx is clear and moist.  No oral thrush.   Eyes: Conjunctivae are normal. Pupils are equal,

## 2020-01-27 NOTE — PROGRESS NOTES
Pain:  Denies. Social/Functional History:    Lives With: Alone  Type of Home: House  Home Layout: One level  Home Access: Stairs to enter with rails  Entrance Stairs - Number of Steps: 3 RICHARD  Entrance Stairs - Rails: Right  Home Equipment: Cane, Rolling walker     Bathroom Shower/Tub: Tub/Shower unit  Bathroom Toilet: Handicap height       ADL Assistance: Independent  Homemaking Assistance: Needs assistance  Ambulation Assistance: Independent  Transfer Assistance: Independent          Additional Comments: daughter lives nearby and assists with cooking/cleaning and groceries. pt indep with ADLs, ambulates with cane at baseline    OBJECTIVE:  Range of Motion:  Right Lower Extremity: WFL  Left Lower Extremity: [email or web address]    Strength:  Right Lower Extremity: WFL  Left Lower Extremity: [employer]  Hip flexion: 4/5, distal LE: 5/5    Balance:  Static Sitting Balance:  Supervision, Stand By Assistance  Dynamic Sitting Balance: Stand By Assistance, Contact Guard Assistance    Bed Mobility:  Supine to Sit: Stand By Assistance, with verbal cues , with increased time for completion  Sit to Supine: Stand By Assistance, with verbal cues , with increased time for completion   HOB flat, no rail    Transfers:  Not tested    Exercise:  Patient was guided in 1 set(s) 10 reps of exercise to both lower extremities. Ankle pumps, Heelslides, Hip abduction/adduction and Straight leg raises. Exercises were completed for increased independence with functional mobility. Functional Outcome Measures: Completed  AM-PAC Inpatient Mobility without Stair Climbing Raw Score : 9  AM-PAC Inpatient without Stair Climbing T-Scale Score : 32.44    ASSESSMENT:  Activity Tolerance:  Patient tolerance of  treatment: good. Treatment Initiated: Treatment and education initiated within context of evaluation.   Evaluation time included review of current medical information, gathering information related to past medical, social and functional history, completion of standardized testing, formal and informal observation of tasks, assessment of data and development of plan of care and goals. Treatment time included skilled education and facilitation of tasks to increase safety and independence with functional mobility for improved independence and quality of life. HEP instructed in supine. Upon sitting EOB, pt reports increased SOB. O2 was found to be in mid 70's. RN alerted. Pt returned to supine and was placed on 2L Post session, tech present in room. RN aware. Bed alarm on. PT will cont to assess further mobility when pt is medically appropriate for ambulation. Assessment: Body structures, Functions, Activity limitations: Decreased functional mobility , Decreased safe awareness, Decreased balance, Decreased endurance, Decreased strength, Decreased posture  Assessment: Pt admitted secondary to weakness. Pt is grossly deconditioned at time of evaluation. Pt unable to tolerated EOB sitting without O2 desaturation. Pt will benefit from skilled PT services during admission and post d/c for improved functional I and safety with mobility. Prognosis: Good    REQUIRES PT FOLLOW UP: Yes    Discharge Recommendations:  Discharge Recommendations: Continue to assess pending progress    Patient Education:  PT Education: Goals, Plan of Care, PT Role, Transfer Training, Home Exercise Program    Equipment Recommendations:   Other: will cont to assess    Plan:  Times per week: 5x /gm  Specific instructions for Next Treatment: AAT  Current Treatment Recommendations: Transfer Training, Strengthening, Endurance Training, Patient/Caregiver Education & Training, Safety Education & Training, Home Exercise Program, Balance Training    Goals:  Patient goals : return home  Short term goals  Time Frame for Short term goals: by discharge  Short term goal 1: rolling with I for ease of bed mobility   Short term goal 2: supine <> sit with I for ease of bed mobility   Short term goal 3: pt to tolerate sitting EOB for 15 minutes, no LOB while completing dynamic seated task with I to prepare for OOB activity. Short term goal 4: PT to assess further mobility   Long term goals  Time Frame for Long term goals : N/A secondary to short ELOS    Following session, patient left in safe position with all fall risk precautions in place.

## 2020-01-27 NOTE — PROGRESS NOTES
Gastroenterology Progress Note:     Patient Name:  Eanrest Nation   MRN: [id number]  [id number]  YOB: 1933  Admit Date: 1/24/2020 12:54 AM  Primary Care Physician: Sherryle Go, MD   8B-30/030-A     Patient seen and examined. 24 hours events and chart reviewed. Subjective: Patient sleeping in bed, arouses easily. Denies abdominal pain, nausea, and vomiting. She complains of dysphagia in the proximal esophagus. 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 Cardiology starting a heparin gtt and Plavix. Objective:  /64   Pulse 72 Comment: irregular  Temp 97.5 °F (36.4 °C) (Axillary)   Resp 20   Ht 5' 2\" (1.575 m)   Wt 81 lb 8 oz (37 kg)   SpO2 100%   BMI 14.91 kg/m²     Physical Exam:    General:  Acutely ill appearing female  HEENT: Atraumatic, normocephalic. Dry oral mucous membranes. Neck: Supple without adenopathy, JVD, thyromegaly or masses. Trachea midline. CV: Heart RRR, no murmurs, rubs, gallops. Resp: Even, easy without cough or accessory use. Lungs with crackles to ascultation bilaterally. Abd: Round, soft, nontender. No hepatosplenomegaly or mass present. Active bowel sounds heard. No distention noted. Ext:  Without cyanosis, clubbing. 2+ edema BLE. Skin: Pink, warm, dry  Neuro:  Alert, oriented x 3 with no obvious deficits.        Rectal: deferred    Labs:   CBC:   Lab Results   Component Value Date    WBC 5.6 01/25/2020    HGB 8.6 01/25/2020    HCT 28.6 01/25/2020    MCV 89.1 01/25/2020     01/25/2020     BMP:   Lab Results   Component Value Date     01/26/2020    K 4.7 01/26/2020    K 4.4 01/24/2020     01/26/2020    CO2 16 01/26/2020    BUN 72 01/26/2020    CREATININE 2.1 01/26/2020    CALCIUM 8.4 01/26/2020     PT/INR:   Lab Results   Component Value Date    INR 0.88 07/15/2016     Lipids:   Lab Results   Component Value Date    ALKPHOS 165 01/24/2020    ALT 18 01/24/2020    AST 28 01/24/2020 deficiency  8. Generalized weakness  9. HTN  10. Right foot ulcer  11. HLD    Plan:    1. Pureed diet, thin liquids no straw  2. Speech therapy  3. Cardiology & pulmonology on board  4. Extensive conversation held with daughter & patient regarding risks and benefits of EGD with dilatation, at this time they have chosen not to go through with the procedure  5. Supportive care per primary team  6. Will documenting on this patient, RN updated this provider that the patient had decided to proceed with hospice and changing code status  GI signing off    Case reviewed and impression/plan reviewed in collaboration with Dr. Slava Goodwin  Electronically signed by MARGIE Armando CNP on 2020 at 11:02 AM    [facility]     Approximately 35 minutes spent doing the followin chart review done; all tests, results, and plan of care reviewed with the patient; all questions answered.

## 2020-01-27 NOTE — PROGRESS NOTES
Hospitalist note    Discussed code status with patient and she would like to be made comfort care. Family members including 2 daughters were present at bedside.     Electronically signed by Varinder Pratt MD on 1/27/2020 at 3:24 PM

## 2020-01-27 NOTE — CONSULTS
0.45 % 1,000 mL infusion   Intravenous Continuous Claudeen Gulling, MD 25 mL/hr at 01/26/20 1357      ipratropium-albuterol (DUONEB) nebulizer solution 1 ampule  1 ampule Inhalation 4x daily Gaby Epps MD   1 ampule at 01/27/20 0819    sodium chloride flush 0.9 % injection 10 mL  10 mL Intravenous 2 times per day [facility]   10 mL at 01/25/20 0904    sodium chloride flush 0.9 % injection 10 mL  10 mL Intravenous PRN [facility], [address]        magnesium hydroxide (MILK OF MAGNESIA) 400 MG/5ML suspension 30 mL  30 mL Oral Daily PRN [facility]        ondansetron (ZOFRAN) injection 4 mg  4 mg Intravenous Q6H PRN [facility], [address]        acetaminophen (TYLENOL) tablet 650 mg  650 mg [facility] Q4H [facility] [place] [place]        [Held by provider] amLODIPine (NORVASC) tablet 10 mg  10 mg Oral Daily [facility], [address]        aspirin EC tablet 325 mg  325 mg Oral Daily [facility], [address]   325 mg at 01/26/20 0802    hydrALAZINE (APRESOLINE) tablet 25 mg  25 mg Oral 3 times per day [facility]   25 mg at 01/27/20 0530    pravastatin (PRAVACHOL) tablet 40 mg  40 mg Oral Daily [facility], [address]   40 mg at 01/26/20 2257    cefTRIAXone (ROCEPHIN) 1 g IVPB in 50 mL D5W minibag  1 g Intravenous Q24H [facility], [address]   Stopped at 01/26/20 0825    azithromycin (ZITHROMAX) tablet 250 mg  250 mg Oral Daily Elio Fuller MD   250 mg at 01/26/20 0755    Mucus Relief DM  MG TB12 1 tablet  1 tablet Oral BID Elio Fuller MD   1 tablet at 01/26/20 2257    nicotine (NICODERM CQ) 14 MG/24HR 1 patch  1 patch Transdermal Daily Elio Fuller MD   1 patch at 01/26/20 0757    predniSONE (DELTASONE) tablet 20 mg  20 mg Oral Daily Junnie Foil, MD   20 mg at 01/26/20 0755    LORazepam (ATIVAN) tablet 0.25 mg  0.25 mg Oral Daily PRN Elio Fuller MD         Prior to Admission medications    Medication Sig Start Date End Date Taking?  Authorizing Provider   amLODIPine (NORVASC) 10 MG tablet Take 1 tablet by mouth daily 7/18/16   Kaycee Trujillo MD   hydrALAZINE (APRESOLINE) 25 MG tablet Take 1 tablet by mouth every 8 hours 7/18/16   Kaycee Trujillo MD   Blood Pressure Monitor KIT Check bp three times a day, call PCP if above 160 7/18/16   Kaycee Trujillo MD   ondansetron (ZOFRAN) 4 MG tablet Take 1 tablet by mouth daily as needed for Nausea or Vomiting 7/18/16   [address], MD   Multiple Vitamins-Minerals ([address]) Take by mouth daily    Historical Provider, MD   magnesium hydroxide (MILK OF MAGNESIA) 400 MG/5ML suspension Take 30 mLs by mouth daily as needed for Constipation 7/13/16   [address], MD   aspirin 325 MG EC tablet Take 1 tablet by mouth daily. 3/10/15   Holden Zuñiga MD   fluticasone (FLONASE) 50 MCG/ACT nasal spray 1 spray by Nasal route daily. Historical Provider, MD   pravastatin (PRAVACHOL) 40 MG tablet Take 40 mg by mouth daily.     Historical Provider, MD   Scheduled Meds:   iron sucrose  200 mg Intravenous Daily    pantoprazole  40 mg Oral QAM AC    folic acid  1 mg Oral Daily    ipratropium-albuterol  1 ampule Inhalation 4x daily    sodium chloride flush  10 mL Intravenous 2 times per day    [Held by provider] amLODIPine  10 mg Oral Daily    aspirin  325 mg Oral Daily    hydrALAZINE  25 mg Oral 3 times per day    pravastatin  40 mg Oral Daily    cefTRIAXone (ROCEPHIN) IV  1 g Intravenous Q24H    azithromycin  250 mg Oral Daily    Mucus Relief DM  1 tablet Oral BID    nicotine  1 patch Transdermal Daily    predniSONE  20 mg Oral Daily     Continuous Infusions:   sodium bicarbonate infusion 25 mL/hr at 01/26/20 1357     PRN Meds:.sodium chloride flush, magnesium hydroxide, ondansetron, acetaminophen, LORazepam    Allergies   Allergen Reactions    Flagyl [Metronidazole] Nausea And Vomiting     No family history on file. Social History     Socioeconomic History    Marital status:      Spouse name: Not on file    Number of children: Not on file    Years of education: Not on file    Highest education level: Not on file   Occupational History    Not on file   Social Needs    Financial resource strain: Not on file    Food insecurity:     Worry: Not on file     Inability: Not on file    Transportation needs:     Medical: Not on file     Non-medical: Not on file   Tobacco Use    Smoking status: Current Every Day Smoker     Packs/day: 0.50    Smokeless tobacco: Never Used   Substance and Sexual Activity    Alcohol use: Yes     Comment: rarely    Drug use: No    Sexual activity: Not on file   Lifestyle    Physical activity:     Days per week: Not on file     Minutes per session: Not on file    Stress: Not on file   Relationships    Social connections:     Talks on phone: Not on file     Gets together: Not on file     Attends Pentecostalism service: Not on file     Active member of club or organization: Not on file     Attends meetings of clubs or organizations: Not on file     Relationship status: Not on file    Intimate partner violence:     Fear of current or ex partner: Not on file     Emotionally abused: Not on file     Physically abused: Not on file     Forced sexual activity: Not on file   Other Topics Concern    Not on file   Social History Narrative    Not on file     ROS:   Constitutional: Denies any recent wt change. Eyes:  Denies any blurring or double vision, no glaucoma  Ears/Nose/Mouth/Throat:  Denies any chronic sinus/rhinitis, bleeding gums  Cardiovascular:  As described above.     Respiratory:  Denies any frequent cough, wheezing or coughing up blood  Genitourinary:  Denies difficulty with urination and kidney stones  Gastrointestinal:  Denies any chronic problems with abdominal pain, nausea, vomiting or diarrhea  Musculoskeletal:  Denies any joint pain, back pain, or difficulty walking  Integumentary:  Denies any rash  Neurological:  No numbness or tingling  Endocrine:  Denies any polydipsia. Hematologic/Lymphatic:  Denies any hemorrhage or lymphatic drainage problems. Labs:  CBC:   Recent Labs     01/25/20  0652   WBC 5.6   HGB 8.6*   HCT 28.6*   MCV 89.1        BMP:   Recent Labs     01/25/20  0652 01/26/20  0458    143   K 4.7 4.7   * 111   CO2 14* 16*   BUN 64* 72*   CREATININE 2.2* 2.1*   MG 2.1 2.2     Accucheck Glucoses: No results for input(s): POCGLU in the last 72 hours. Cardiac Enzymes: No results for input(s): CKTOTAL, CKMB, CKMBINDEX, TROPONINI in the last 72 hours. PT/INR: No results for input(s): PROTIME, INR in the last 72 hours. APTT: No results for input(s): APTT in the last 72 hours.   Liver Profile:  Lab Results   Component Value Date    AST 28 01/24/2020    ALT 18 01/24/2020    BILIDIR <0.2 08/09/2018    BILITOT 0.3 01/24/2020    ALKPHOS 165 01/24/2020   No results found for: CHOL, HDL, TRIG  TSH:   Lab Results   Component Value Date    TSH 3.880 01/25/2020     UA:   Lab Results   Component Value Date    COLORU YELLOW 01/24/2020    PHUR 5.0 01/24/2020    LABCAST NONE SEEN 03/04/2015    LABCAST NONE SEEN 03/04/2015    WBCUA 0-2 01/24/2020    RBCUA 0-2 01/24/2020    YEAST NONE SEEN 01/24/2020    BACTERIA NONE SEEN 01/24/2020    SPECGRAV 1.018 03/04/2015    LEUKOCYTESUR NEGATIVE 01/24/2020    UROBILINOGEN 0.2 01/24/2020    BILIRUBINUR NEGATIVE 01/24/2020    BLOODU NEGATIVE 01/24/2020    GLUCOSEU NEGATIVE 01/24/2020         Physical Exam:  Vitals:    01/27/20 0827   BP: 120/64   Pulse: 72   Resp: 20   Temp: 97.5 °F (36.4 °C)   SpO2: 100%        Intake/Output Summary (Last 24 hours) at 1/27/2020 1017  Last data filed at 1/27/2020 0431  Gross per 24 hour   Intake 1215.6 ml   Output --   Net 1215.6 ml      General:  No acute distress, frail, malnourished  Neck: Supple, no JVD, no carotid bruits  Heart: Regular rhythm normal S1 and S2, no rubs, murmurs or gallops  Lungs: Bilateral crackles  Abdomen: positive bowel sounds, soft, non-tender, non-distended, no bruits, no masses  Extremities:no clubbing, cyanosis, 2+ edema  Neurologic: alert and oriented x 3, cranial nerves 2-12 grossly intact, motor and sensory intact, moving all extremities  Skin: No rashes    Assessment:  Dyspnea   LE edema  Elevated troponins with inferolateral TWI - NSTEMI  Acute on chronic diastolic CHF, NYHA III  Moderate MR  Moderate AS  Preserved EF  Large right pleural effusion  Stage III coccyx wound  Right foot dorsal wound  JACKIE on CKD  Malnourished  DNR-CCA      Plan:  1. Could consider cardiac cath based on her goals of care and failing renal function as long as she is clear on the need for possible dialysis, otherwise medical therapy for the time being  2. Would not stress if there is no possibility of cardiac cath as she appears to have CAD based on the current data  3. Heparin gtt  4. DAPT  5. Metoprolol 25 mg BID  6. Lasix 40 mg IV BID  7. Continue statin  8. Medical therapy for NSTEMI for the time being, and then decide on goals of care  9. Drain the pleural effusion  10. Add Aldactone once able to based on renal function  11. Further recommendations based on results and clinical course    Thank you for allowing us to participate in the care of this patient. Please do not hesitate to call us with questions.     Electronically signed by Adiel Santa MD on 1/27/2020 at 10:17 AM    Interventional Cardiology - [facility]

## 2020-01-27 NOTE — FLOWSHEET NOTE
[facility] THERAPY MISSED TREATMENT NOTE  [facility] MED SURG 8B  8B-30/030-A      Date: 2020  Patient Name: Heidi Unger        CSN: [id number]   : 3/10/1933  (80 y.o.)  Gender: female   Referring Practitioner: Dr. Denver Lang  Diagnosis: weakness          REASON FOR MISSED TREATMENT:  RN with patient upon first attempt. Patient being placed on Hospice.

## 2020-01-28 VITALS
BODY MASS INDEX: 14.91 KG/M2 | DIASTOLIC BLOOD PRESSURE: 58 MMHG | SYSTOLIC BLOOD PRESSURE: 119 MMHG | WEIGHT: 81 LBS | HEIGHT: 62 IN | OXYGEN SATURATION: 96 % | HEART RATE: 64 BPM | RESPIRATION RATE: 16 BRPM | TEMPERATURE: 97.6 F

## 2020-01-28 PROCEDURE — 94669 MECHANICAL CHEST WALL OSCILL: CPT

## 2020-01-28 PROCEDURE — 6360000002 HC RX W HCPCS: Performed by: NURSE PRACTITIONER

## 2020-01-28 PROCEDURE — 99231 SBSQ HOSP IP/OBS SF/LOW 25: CPT | Performed by: INTERNAL MEDICINE

## 2020-01-28 PROCEDURE — 99239 HOSP IP/OBS DSCHRG MGMT >30: CPT | Performed by: INTERNAL MEDICINE

## 2020-01-28 PROCEDURE — 2709999900 HC NON-CHARGEABLE SUPPLY

## 2020-01-28 PROCEDURE — 2700000000 HC OXYGEN THERAPY PER DAY

## 2020-01-28 PROCEDURE — 6370000000 HC RX 637 (ALT 250 FOR IP): Performed by: INTERNAL MEDICINE

## 2020-01-28 PROCEDURE — 6370000000 HC RX 637 (ALT 250 FOR IP): Performed by: NURSE PRACTITIONER

## 2020-01-28 PROCEDURE — 6370000000 HC RX 637 (ALT 250 FOR IP): Performed by: PHARMACIST

## 2020-01-28 PROCEDURE — 94760 N-INVAS EAR/PLS OXIMETRY 1: CPT

## 2020-01-28 PROCEDURE — 94640 AIRWAY INHALATION TREATMENT: CPT

## 2020-01-28 PROCEDURE — 99232 SBSQ HOSP IP/OBS MODERATE 35: CPT | Performed by: NURSE PRACTITIONER

## 2020-01-28 RX ORDER — FOLIC ACID 1 MG/1
1 TABLET ORAL DAILY
Qty: 30 TABLET | Refills: 3 | Status: SHIPPED | OUTPATIENT
Start: 2020-01-29

## 2020-01-28 RX ORDER — FERROUS SULFATE 325(65) MG
325 TABLET ORAL 2 TIMES DAILY WITH MEALS
Qty: 30 TABLET | Refills: 3 | Status: SHIPPED | OUTPATIENT
Start: 2020-01-28

## 2020-01-28 RX ORDER — IPRATROPIUM BROMIDE AND ALBUTEROL SULFATE 2.5; .5 MG/3ML; MG/3ML
3 SOLUTION RESPIRATORY (INHALATION) 4 TIMES DAILY
Qty: 360 ML | Refills: 0 | Status: SHIPPED | OUTPATIENT
Start: 2020-01-28

## 2020-01-28 RX ORDER — BUDESONIDE 0.5 MG/2ML
0.5 INHALANT ORAL 2 TIMES DAILY
Qty: 60 AMPULE | Refills: 3 | Status: SHIPPED | OUTPATIENT
Start: 2020-01-28

## 2020-01-28 RX ORDER — CEFDINIR 300 MG/1
300 CAPSULE ORAL DAILY
Status: DISCONTINUED | OUTPATIENT
Start: 2020-01-28 | End: 2020-01-28 | Stop reason: HOSPADM

## 2020-01-28 RX ORDER — ONDANSETRON 4 MG/1
4 TABLET, ORALLY DISINTEGRATING ORAL 3 TIMES DAILY PRN
Qty: 21 TABLET | Refills: 0 | Status: SHIPPED | OUTPATIENT
Start: 2020-01-28

## 2020-01-28 RX ORDER — FERROUS SULFATE 325(65) MG
325 TABLET ORAL 2 TIMES DAILY WITH MEALS
Status: DISCONTINUED | OUTPATIENT
Start: 2020-01-28 | End: 2020-01-28 | Stop reason: HOSPADM

## 2020-01-28 RX ORDER — HYDRALAZINE HYDROCHLORIDE 25 MG/1
25 TABLET, FILM COATED ORAL 2 TIMES DAILY
Status: DISCONTINUED | OUTPATIENT
Start: 2020-01-28 | End: 2020-01-28

## 2020-01-28 RX ORDER — CEFDINIR 300 MG/1
300 CAPSULE ORAL 2 TIMES DAILY
Qty: 8 CAPSULE | Refills: 0 | Status: SHIPPED | OUTPATIENT
Start: 2020-01-28 | End: 2020-02-01

## 2020-01-28 RX ORDER — GUAIFENESIN, DEXTROMETHORPHAN HBR 600; 30 MG/1; MG/1
1 TABLET ORAL 2 TIMES DAILY PRN
Qty: 28 TABLET | Refills: 0 | Status: SHIPPED | OUTPATIENT
Start: 2020-01-28

## 2020-01-28 RX ORDER — PANTOPRAZOLE SODIUM 40 MG/1
40 TABLET, DELAYED RELEASE ORAL 2 TIMES DAILY
Qty: 60 TABLET | Refills: 3 | Status: SHIPPED | OUTPATIENT
Start: 2020-01-28

## 2020-01-28 RX ORDER — BUMETANIDE 1 MG/1
1 TABLET ORAL DAILY
Qty: 30 TABLET | Refills: 3 | Status: SHIPPED | OUTPATIENT
Start: 2020-01-29

## 2020-01-28 RX ORDER — ASPIRIN 81 MG/1
81 TABLET ORAL DAILY
Qty: 30 TABLET | Refills: 3 | Status: SHIPPED | OUTPATIENT
Start: 2020-01-29 | End: 2020-01-28

## 2020-01-28 RX ORDER — METOPROLOL SUCCINATE 25 MG/1
25 TABLET, EXTENDED RELEASE ORAL DAILY
Qty: 30 TABLET | Refills: 3 | Status: SHIPPED | OUTPATIENT
Start: 2020-01-28

## 2020-01-28 RX ORDER — METOPROLOL SUCCINATE 25 MG/1
25 TABLET, EXTENDED RELEASE ORAL DAILY
Status: DISCONTINUED | OUTPATIENT
Start: 2020-01-28 | End: 2020-01-28 | Stop reason: HOSPADM

## 2020-01-28 RX ORDER — LORAZEPAM 0.5 MG/1
0.25 TABLET ORAL DAILY PRN
Qty: 5 TABLET | Refills: 0 | Status: SHIPPED | OUTPATIENT
Start: 2020-01-28 | End: 2020-01-31

## 2020-01-28 RX ADMIN — IPRATROPIUM BROMIDE AND ALBUTEROL SULFATE 1 AMPULE: .5; 3 SOLUTION RESPIRATORY (INHALATION) at 08:57

## 2020-01-28 RX ADMIN — FOLIC ACID 1 MG: 1 TABLET ORAL at 10:17

## 2020-01-28 RX ADMIN — BUDESONIDE 500 MCG: 0.5 INHALANT RESPIRATORY (INHALATION) at 08:57

## 2020-01-28 RX ADMIN — BUMETANIDE 1 MG: 1 TABLET ORAL at 10:18

## 2020-01-28 RX ADMIN — FERROUS SULFATE TAB 325 MG (65 MG ELEMENTAL FE) 325 MG: 325 (65 FE) TAB at 10:42

## 2020-01-28 RX ADMIN — CEFDINIR 300 MG: 300 CAPSULE ORAL at 10:43

## 2020-01-28 RX ADMIN — GUAIFENESIN AND DEXTROMETHORPHAN HYDROBROMIDE 1 TABLET: 600; 30 TABLET, EXTENDED RELEASE ORAL at 10:16

## 2020-01-28 RX ADMIN — METOPROLOL SUCCINATE 25 MG: 25 TABLET, FILM COATED, EXTENDED RELEASE ORAL at 13:46

## 2020-01-28 RX ADMIN — PANTOPRAZOLE SODIUM 40 MG: 40 TABLET, DELAYED RELEASE ORAL at 10:20

## 2020-01-28 RX ADMIN — AZITHROMYCIN 250 MG: 250 TABLET, FILM COATED ORAL at 10:13

## 2020-01-28 RX ADMIN — ASPIRIN 81 MG: 81 TABLET ORAL at 10:12

## 2020-01-28 ASSESSMENT — PAIN SCALES - GENERAL
PAINLEVEL_OUTOF10: 0

## 2020-01-28 NOTE — CARE COORDINATION
1/28/20, 1:46 PM    Patient goals/plan/ treatment preferences discussed by  and . Patient goals/plan/ treatment preferences reviewed with patient/ family. Patient/ family verbalize understanding of discharge plan and are in agreement with goal/plan/treatment preferences. Understanding was demonstrated using the teach back method. AVS provided by RN at time of discharge, which includes all necessary medical information pertaining to the patients current course of illness, treatment, post-discharge goals of care, and treatment preferences. Services After Discharge  Services At/After Discharge: In ambulance, Hospice([facility])   IMM Letter  IMM Letter date given[de-identified] 01/24/20  IMM Letter time given[de-identified] [id number]       Patient discharged to home with [facility] via [facility] with 2:00 transport. Erica Low at [facility] aware of discharge and 2:00 transport. They will be at house to admit patient to Hospice, AVS faxed. Spoke with daughter Lucille Weaver, informed of discharge and 2:00 transport.  Lucille Weaver is at patient house waiting for her arrival.
Private Residence  Living Arrangements:  Children, Parent, Spouse/Significant Other   Support Systems:  Children, Family Members  Current Services PTA:     Potential Assistance Needed:  N/A  Potential Assistance Purchasing Medications:  No  Does patient want to participate in local refill/ meds to beds program?  No  Type of Home Care Services:  None  Patient expects to be discharged to:  home alone  Expected Discharge date:  01/27/20  Follow Up Appointment: Best Day/ Time: Monday AM    Patient Goals/Plan/Treatment Preferences: Met with pt today. She appears quite frail. She is appropriate in conversation at present. She lives alone. States her daughter gets her grocers and takes her to appointments. She states she can drive, however. She has a PCP and states no issue getting meds. She does not have [facility] services. She does use a cane. SW is consulted for potential home going needs. Transportation/Food Security/Housekeeping Addressed:  No issues identified.     Evaluation: yes

## 2020-01-28 NOTE — PROGRESS NOTES
Cardiology Progress Note      Patient:  Gray Wheeler  YOB: 1933  MRN: [id number]   Acct: [de-identified]  Admit Date:  1/24/2020  Primary Cardiologist: none    Per prior cardiology consult note-  CHIEF COMPLAINT: Trop        HPI: This is a pleasant 80 y.o. female presents with CHF, moderate AS, COPD, active pressure ulcer of the coccyx and right foot dorsal wound who presents for CAP with COPD. She came in with general weakness and fatigue. Pulmonary following.  ? Of RML collapse. Wound care following.  + SOB and SHAW. No chest pain. Worsening LE edema. No angina. No LOC or palpitations. 1/23 TTE shows EF 50-55%, JOSÉ 1.3 cm2, moderate MR. Cr 2.1, Trop 0.073--0.102. BNP 50498. She is DNR-CCA. She had an esophagram due to dysphagia.         Subjective (Events in last 24 hours):     Pt in bed   No chest pain - SOB per pt \"same\"  She doesn't want invasive cardiac workup - just medications   Discussed last echo with her - discussed she probable had heart attack but cant be for sure - she understands and wants medicines only     She refused thoracentesis also       Objective:   BP (!) 100/49   Pulse 55   Temp 97.4 °F (36.3 °C) (Oral)   Resp 16   Ht 5' 2\" (1.575 m)   Wt 81 lb (36.7 kg)   SpO2 99%   BMI 14.82 kg/m²        TELEMETRY: not on     Physical Exam:  General Appearance: alert and oriented to person, place and time, in no acute distress  Cardiovascular: normal rate, regular rhythm, normal S1 and S2, no murmurs, rubs, clicks, or gallops, distal pulses intact  Pulmonary/Chest: clear upper - diminished lower to auscultation bilaterally- no wheezes, rales or rhonchi, normal air movement, no respiratory distress  Abdomen: soft, non-tender, non-distended, normal bowel sounds, no masses Extremities: no cyanosis, clubbing or edema, pulses present    Skin: warm and dry, no rash or erythema  Head: normocephalic and atraumatic  Musculoskeletal: normal range of motion, no joint swelling, deformity or tenderness  Neurological: alert, oriented, normal speech, no focal findings or movement disorder noted    Medications:    metoprolol tartrate  25 mg Oral BID    aspirin  81 mg Oral Daily    cefdinir  300 mg Oral 2 times per day    bumetanide  1 mg Oral Daily    pantoprazole  40 mg Oral BID    budesonide  0.5 mg Nebulization BID    folic acid  1 mg Oral Daily    ipratropium-albuterol  1 ampule Inhalation 4x daily    sodium chloride flush  10 mL Intravenous 2 times per day    hydrALAZINE  25 mg Oral 3 times per day    pravastatin  40 mg Oral Daily    azithromycin  250 mg Oral Daily    Mucus Relief DM  1 tablet Oral BID    nicotine  1 patch Transdermal Daily       sodium chloride flush, 10 mL, PRN  magnesium hydroxide, 30 mL, Daily PRN  ondansetron, 4 mg, Q6H PRN  acetaminophen, 650 mg, Q4H PRN  LORazepam, 0.25 mg, Daily PRN        Diagnostics:  EK2020 09:08:01 [facility] ROUTINE RETRIEVAL  Sinus rhythm with marked sinus arrhythmia  T wave abnormality, consider inferolateral ischemia  Abnormal ECG  When compared with ECG of 2020 05:27,  Premature supraventricular complexes are no longer Present  Confirmed by Sangeetha Charles ([id number]) on 2020 8:27:18 AM    Echo: [facility] 2020  Conclusions:     Global left ventricular wall motion and contractility are within  normal limits. The systolic global ejection fraction is low normal. The  estimated ejection fraction is 81-52%.    The diastolic filling pattern is pseudonormal, consistent with at  least mildly elevated LA pressure (Moderate diastolic dysfunction).    The right ventricular cavity size is normal. The right ventricular  global systolic function is mildly reduced.    There is severe biatrial enlargement.    The aortic valve leaflets are thickened. The left coronary cusp is  calcified and appers to have reduced systolic excursion/is immobile.   Techically difficult LVOT and AV doppler waveforms however there may be  moderate aortic stenosis. JOSÉ by VTI is 1.3cm2.    The mitral valve leaflets are mildly thickened. There is moderate  mitral regurgitation.    The tricuspid valve leaflets are mildly thickened. There is moderate  tricuspid regurgitation.    The right ventricular systolic pressure is significantly elevated and  estimated to be 70-75 mmHg.    There is mild pulmonic regurgitation.    A trivial pericardial effusion is visualized. Lab Data:    Cardiac Enzymes:  No results for input(s): CKTOTAL, CKMB, CKMBINDEX, TROPONINI in the last 72 hours.     CBC:   Lab Results   Component Value Date    WBC 5.6 01/25/2020    RBC 3.21 01/25/2020    HGB 8.6 01/25/2020    HCT 28.6 01/25/2020     01/25/2020       CMP:    Lab Results   Component Value Date     01/26/2020    K 4.7 01/26/2020    K 4.4 01/24/2020     01/26/2020    CO2 16 01/26/2020    BUN 72 01/26/2020    CREATININE 2.1 01/26/2020    LABGLOM 22 01/26/2020    GLUCOSE 98 01/26/2020    CALCIUM 8.4 01/26/2020       Hepatic Function Panel:    Lab Results   Component Value Date    ALKPHOS 165 01/24/2020    ALT 18 01/24/2020    AST 28 01/24/2020    PROT 6.2 01/24/2020    BILITOT 0.3 01/24/2020    BILIDIR <0.2 08/09/2018    LABALBU 3.0 01/24/2020       Magnesium:    Lab Results   Component Value Date    MG 2.2 01/26/2020       PT/INR:    Lab Results   Component Value Date    INR 0.88 07/15/2016       HgBA1c:  No results found for: LABA1C    FLP:  No results found for: TRIG, HDL, LDLCALC, LDLDIRECT, LABVLDL    TSH:    Lab Results   Component Value Date    TSH 3.880 01/25/2020         Assessment:  Dyspnea    Acute on chronic diastolic chf / RHF secondary to valvular heart Disease and PHTN -- improved     PNA -treated by Pulmonary       PHTN  Valvular heart disease: moderate MR / TR - mild - moderate AS    Elevated trop- likely type II (PNA / CKD) -- per pt request \"treat medically\"    Large RT pleural effusion   JACKIE / CKD stage

## 2020-01-28 NOTE — PROGRESS NOTES
[facility] 60  PHYSICAL THERAPY MISSED TREATMENT NOTE  ACUTE CARE    Date: 2020  Patient Name: Moy Manuel        MRN: [id number]   : 3/10/1933  (80 y.o.)  Gender: female   Referring Practitioner: Dr. Brandon Richardson  Referring Practitioner: JOSE Florez  Diagnosis: weakness   Diagnosis: weakness         REASON FOR MISSED TREATMENT:  Missed Treat. Per RN, pt  [facility] home today with Hospice. Per chart, has 24/7 care from family. PT will sign off.

## 2020-01-28 NOTE — PROGRESS NOTES
[facility] THERAPY MISSED TREATMENT NOTE  [facility] MED SURG 8B  8B-30/030-A      Date: 2020  Patient Name: Sohan Silverio        CSN: [id number]   : 3/10/1933  (80 y.o.)  Gender: female   Referring Practitioner: Dr. Vaughan Masters  Diagnosis: weakness          REASON FOR MISSED TREATMENT: Per nursing, pt transferred POC to hospice measures with plan to return home today with family. Will discharge from OT services at this time.

## 2020-01-28 NOTE — PROGRESS NOTES
mg Oral Daily    bumetanide  1 mg Oral Daily    pantoprazole  40 mg Oral BID    budesonide  0.5 mg Nebulization BID    folic acid  1 mg Oral Daily    sodium chloride flush  10 mL Intravenous 2 times per day    pravastatin  40 mg Oral Daily    Mucus Relief DM  1 tablet Oral BID    nicotine  1 patch Transdermal Daily     Meds prn: sodium chloride flush, magnesium hydroxide, ondansetron, acetaminophen, LORazepam       Impression and Plan:  1. JACKIE on CKD 3  2. Shortness of breath with pleural effusion  3. Mild ascites with bilateral pleural effusion as well as possible hepatocellular dysfunction  4. Peripheral edema with ascites. 5.  Large right pleural effusion  6. Possible hepatocellular dysfunction  7. Distended esophagus, achalasia  8. Severe protein calorie malnutrition and weight loss  9. History of smoking and tobacco use    Patient will be transitioning to hospice  Can continue with Bumex for comfort measures  Nephrology will sign off. Discussed with nursing staff.   Please call if any questions    Kalyan Willis MD  [facility]

## 2020-01-28 NOTE — DISCHARGE SUMMARY
Hospital Medicine Discharge Summary      Patient:  Moy Manuel  YOB: 1933  MRN: [id number]   PCP: Manuela Young MD         Acct: [de-identified]     Admit Date: 1/24/2020     Discharge Date:  1/28/2020      Admitting Physician: Codey Link MD  Discharge Physician: Erika Mack MD     Discharge Diagnoses     DISCHARGE DIAGNOSES:  1. Acute respiratory failure with hypoxia in the setting of community acquired bacterial pneumonia associated with mild COPD exacerbation  2. Mild to moderate acute on chronic heart failure with preserved EF in the setting of moderate aortic stenosis, severe pulmonary HTN, with bilateral LE edema. Evidence of anasarca with ascites as well   3. Right middle lobe collapse with question of mucous plug  4. Elevated troponin  5. Dysphagia with evidence for possible esophageal achalasia/ severe esophageal stenosis  6. Acute kidney injury with significant metabolic acidosis  7. Urinary retention  8. Normocytic anemia/folic acid deficiency  9. Generalized weakness   10. Essential HTN  11. Hepatic steatosis  12. Isolated elevated alkaline phosphatase  13. Right foot ulcer  14. Severe protein calorie malnutrition  15. Tobacco dependence  16. Hyperlipidemia      Disposition:    [x] Home with hospice. CODE STATUS changed to [facility] on 1/27 when patient decided to transition to hospice. Her family was supportive of this decision. Condition at Discharge: Stable    The patient was seen and examined on day of discharge and this discharge summary is in conjunction with any daily progress note from day of discharge. Hospital course     Moy Manuel is a 80 y.o. female  w/ a hx of tobacco dependence, essential HTN, HLD, moderate aortic stenosis and severe pulmonary HTN who was admitted for worsening shortness of breath and generalized weakness since December.  The patient states that she initially had flu symptoms in December and has been persistently short of breath since then, with associated symptoms of a productive cough with worsening bilateral LE edema. She was initially evaluated at [facility] where EKG showed NSR, CXR showed \"asymmetrical hilar densities, patchy right bibasilar opacities possibly pneumonia vs post-obstructive atelactasis related to a central lobar lesion. 1. Acute respiratory failure with hypoxia in the setting of community acquired bacterial pneumonia associated with mild COPD exacerbation. Procalcitonin increased. Continue breathing treatments, Mucinex DM, Acapella. Blood cultures ordered. Strep pneumonia antigen negative. Patient respiratory status started to improve but acutely worsened over the last 2 days prior to discharge likely related to recurrence of heart failure. Stop steroids today. Patient was requiring up to 2L O2. Home O2 evaluation pending at the time of discharge. Transitioned to cefdinir at the time of discharge  2. Mild to moderate acute on chronic heart failure with preserved EF in the setting of moderate aortic stenosis, severe pulmonary HTN, with bilateral LE edema. Evidence of anasarca with ascites as well : multifactorial bilateral LE edema- thought to be related to combination of low albumin, heart failure in the setting of moderate aortic stenosis. Possibly related to use of amlodipine so currently held. S/p Albumin/Lasix. Last echo on 1/23 showed EF of 50-55% with moderate aortic stenosis with JOSÉ of 1.3 cm2, moderate mitral regurgitation and moderate TR and RVSP of 70 to 75 mmHg. Patient however dehydrated at the same time as being fluid overloaded so hydrated with fluids, but started to experience volume overload. Discharged on Bumex. Continue Ace wrap/compression stockings to the bilateral LE  3. Right middle lobe collapse with question of mucous plug: Pulmonology consulted. Will plan for repeat CT chest in 6 weeks- and will determine if bronchoscopy is necessary after this.  Gold quantiferon ordered per CREATININE 2.1 01/26/2020    CALCIUM 8.4 01/26/2020         Radiology     Radiology:      Ct Chest Wo Contrast    Result Date: 1/24/2020  PROCEDURE: CT CHEST WO CONTRAST CLINICAL INFORMATION: pneumonia versus central lesion with atelectasis COMPARISON: No prior study. TECHNIQUE: Multiple axial 5 millimeter images of the thorax and upper abdomen were obtained without the administration of intravenous contrast material . All CT scans at this facility use dose modulation, iterative reconstruction, and/or weight-based dosing when appropriate to reduce radiation dose to as low as reasonably achievable. FINDINGS: Upper abdomen: Prior cholecystectomy. Densely calcified abdominal aorta is mildly aneurysmal, 3.1 cm. Mediastinum and andrew: Pulmonary arteries are prominent, consistent with pulmonary arterial hypertension. A few calcified lymph nodes are present in the right hilum. The previously a few slightly prominent subcarinal lymph nodes. Note that there is moderate diffuse fluid distention of the esophagus with a diameter of 3.1 cm and evidence for an air-fluid level superiorly. Cannot exclude achalasia. Note that there is a saccular aneurysm along the inferior aspect of the aorta at the aorticopulmonary window, approximately 2.9 x 4.2 cm. Despite this aneurysm, the maximal aortic diameter in the coronal plane is only 3.8 cm. Bones: There is extensive calcification in the thoracic aorta. Lungs: Small pleural effusion right side. There is mild atelectasis/pneumonia in the right lung bases. Addition, there appears to be almost total collapse of the right middle lobe which could possibly be related to mucous plug. No distinct mass lesion is  seen. 1. 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 See comments above. Fusiform 3.1 cm upper abdominal aortic aneurysm. Extensive dense calcification throughout the thoracic and abdominal aorta. 2. Mild right basilar atelectasis/pneumonia.  Near total collapse of the right middle lobe which could be due to mucous plug. An obstructing mass lesion is not seen. Small effusion right side. 3. Moderate diffuse fluid distention of the esophagus. Cannot exclude achalasia. A barium esophagram might be considered for further evaluation. **This report has been created using voice recognition software. It may contain minor errors which are inherent in voice recognition technology. ** Final report electronically signed by Dr. Khalida Gilman on 1/24/2020 2:41 PM    Us Renal Complete    Result Date: 1/25/2020  PROCEDURE: US RENAL COMPLETE CLINICAL INFORMATION: acute kidney injury on CKD. COMPARISON: Noncontrast chest CT dated 1/24/2020 TECHNIQUE:Real-time and color flow ultrasound of the kidneys and bladder were performed. FINDINGS: Kidneys: Morphology: Both kidneys are small. Reniform shape is maintained. The kidneys are echogenic consistent with nonspecific chronic medical renal disease. Mass/cyst: There are 0.8 x 0.7 x 0.4 cm and 1.2 x 1.3 x 1 cm simple cysts of the lower pole of the right kidney. There is a 4 x 5 x 3 mm left lower pole simple renal cyst. Hydronephrosis: none Calculi: There are multiple small nodular echogenic foci left renal parenchyma which correspond to probable renal arterial atherosclerotic calcifications and possible tiny nonobstructing calculi noted on the prior CT. There is a 2 x 3 x 1 mm nonobstructing left lower pole calculus. The tiny nonobstructing calculus in the upper pole the right kidney noted on the CT images not visualized. RIGHT KIDNEY - 8.8 x 4.6 x 3.1 cm Resistive Index - 0.62 Cortical Thickness - 0.6 cm LEFT KIDNEY - 7.7 x 4.4 x 4.4 cm Resistive Index - 0.87 Cortical Thickness - 0.5 cm URINARY BLADDER: No bladder wall thickening or obvious mass. Right urine bladder jets was visualized, left jet was not visualized. Pre-Void - 502.9 mL Post-Void - 225.6 mL Other: There are small bilateral pleural effusions.  Liver is echogenic consistent with hepatic which are inherent in voice recognition technology. ** Final report electronically signed by Dr. Kvng Almazan on 1/27/2020 10:03 AM    Us Liver    Result Date: 1/26/2020  PROCEDURE: US LIVER CLINICAL INFORMATION: hepatic steatosis and ascites. COMPARISON: Renal ultrasound dated 1/25/2020 TECHNIQUE: Multiplanar sonographic images were obtained of the liver. Color flow ultrasound of the hepatic and portal veins, IVC and hepatic artery were performed. FINDINGS: Liver size is normal measuring  13.3 cm. Liver is echogenic consistent with hepatic steatosis versus nonspecific hepatocellular disease. There are no liver masses. There is no intrahepatic biliary ductal dilatation. There is normal color flow in the main portal vein, right and left portal veins, hepatic artery, inferior vena cava and all 3 hepatic veins. There is antegrade flow in the hepatic veins and portal veins. The IVC and hepatic veins are distended suggestive of right heart failure. The pancreatic head is unremarkable. The body and tail were obscured by overlying bowel gas. The gallbladder is absent consistent with a history of cholecystectomy. The common bile duct is normal and measures 5.7 mm. Limited imaging of the right kidney demonstrates no hydronephrosis. The right kidney is echogenic consistent with chronic nonspecific medical renal disease as previously noted. There is a small amount of ascites. There is a moderate to large right pleural effusion. Echogenic liver consistent with hepatic steatosis versus nonspecific hepatocellular disease. Antegrade blood flow in the hepatic vasculature. Dilated IVC and hepatic veins likely due to right heart failure, correlate clinically. Pancreatic body and tail obscured by bowel gas. Echogenic right kidney consistent with chronic nonspecific medical renal disease. Small amount of ascites. Moderate to large right pleural effusion. **This report has been created using voice recognition software.  It may contain minor errors which are inherent in voice recognition technology. ** Final report electronically signed by Dr. Elizabeth Floyd on 1/26/2020 10:21 PM    Xr Chest Portable    Result Date: 1/24/2020  PROCEDURE: XR CHEST PORTABLE CLINICAL INFORMATION: shortness of breath. COMPARISON: Chest x-ray dated 7/8/2016 TECHNIQUE: AP Portable chest xray FINDINGS: Lines/tubes/devices: None Lungs/pleura: There is right middle lobe pneumonia. Lungs are hyperinflated consistent with COPD. No pleural effusion. No pneumothorax. Heart: There is mild cardiomegaly, new compared to the prior study. Mediastinum/andrew: No obvious mass or adenopathy. Aorta is tortuous. Skeleton: No significant bone or joint abnormality. Other: There are again left neck surgical clips. Right middle lobe pneumonia. COPD. Mild cardiomegaly. **This report has been created using voice recognition software. It may contain minor errors which are inherent in voice recognition technology. ** Final report electronically signed by Dr. Elizabeth Floyd on 1/24/2020 5:39 AM    Xr Chest 1 Vw    Result Date: 1/27/2020  PROCEDURE: XR CHEST 1 VIEW CLINICAL INFORMATION: Right-sided pleural effusion, shortness of breath TECHNIQUE: AP chest radiograph COMPARISON: AP chest radiograph 1/24/2020 FINDINGS: Cardiac silhouette is obscured by worsening opacities at the right mid and lower lung. The bilateral costophrenic angles remain blunted. Lungs are hyperinflated. Degenerative and scoliotic changes in the thoracic spine are poorly visualized. Surgical clips are again noted in the neck. There are surgical clips in the right upper quadrant of the abdomen. 1. Worsening right-sided pleural effusion with adjacent atelectasis/infiltrate. 2. Small left-sided pleural effusion with adjacent atelectasis/ Final report electronically signed by Dr. Eileen Navarro on 1/27/2020 10:03 AM    Xr Comparison Of Outside Films    Result Date: 1/24/2020  Radiology exam is complete.  No Radiologist

## 2020-01-28 NOTE — PLAN OF CARE
Consult received see  note 1/24.
Problem: Falls - Risk of:  Goal: Will remain free from falls  Description  Will remain free from falls  1/27/2020 0010 by Guero Reynoso RN  Outcome: Ongoing  Note:   Absence of falls this shift. Fall prevention in place. Fall band applied. Bed alarm activated as needed. Problem: Falls - Risk of:  Goal: Absence of physical injury  Description  Absence of physical injury  1/27/2020 0010 by Guero Reynoso RN  Outcome: Ongoing  Note:   Absence of physical injury. Problem: DISCHARGE BARRIERS  Goal: Patient's continuum of care needs are met  1/27/2020 0010 by Guero Reynoso RN  Outcome: Ongoing  Note:   Pt's continuum of care needs met this shift. Problem: Nutrition  Goal: Optimal nutrition therapy  1/27/2020 0010 by Guero Reynoso RN  Outcome: Ongoing  Note:   Pt continues to have poor appetite. Ensure Supplements ordered with meals. Problem: Discharge Planning:  Goal: Participates in care planning  Description  Participates in care planning  Outcome: Ongoing  Note:   Social work consulted for potential ECF placement. Pt comes from home alone. Care plan reviewed with patient. Patient verbalize understanding of the plan of care and contribute to goal setting.
Problem: Impaired respiratory status  Goal: Clear lung sounds  1/27/2020 2125 by Fara Man RCP  Outcome: Ongoing  Note:   Patient receiving nebulizer treatments through [facility] to improve aeration, lung expansion, and bronchial hygiene.
Problem: Impaired respiratory status  Goal: Clear lung sounds  1/28/2020 0913 by Tray Garland RCP  Outcome: Ongoing
Problem: Impaired respiratory status  Goal: Clear lung sounds  Outcome: Ongoing   Continue therapy to improve lung sounds.
Problem: Impaired respiratory status  Goal: Clear lung sounds  Outcome: Ongoing  Note:   Pt started on aerosols and acapella to clear lung sounds/improve aeration and to help clear secretions.
MOBILITY  Goal: Mobility/activity is maintained at optimum level for patient  Outcome: Ongoing  Note:   Pt very weak, and has not got out of bed. Pt repositioned every 2 hours, and as needed. Pt has stage 3 pressure ulcer on coccyx, and upper mid back. Problem: PSYCHOSOCIAL NEEDS  Goal: Demonstrates ability to cope with illness  Outcome: Ongoing  Note:   Pt requesting hospice consult who will see tomorrow. Care plan reviewed with patient. Patient verbalize understanding of the plan of care and contribute to goal setting.

## 2020-01-29 LAB
BLOOD CULTURE, ROUTINE: NORMAL
BLOOD CULTURE, ROUTINE: NORMAL
QUANTI TB GOLD PLUS: NORMAL
QUANTI TB1 MINUS NIL: 0 IU/ML (ref 0–0.34)
QUANTI TB2 MINUS NIL: 0 IU/ML (ref 0–0.34)
QUANTIFERON MITOGEN MINUS NIL: 0.42 IU/ML
QUANTIFERON NIL: 0.03 IU/ML